# Patient Record
Sex: FEMALE | Race: WHITE | NOT HISPANIC OR LATINO | Employment: OTHER | URBAN - METROPOLITAN AREA
[De-identification: names, ages, dates, MRNs, and addresses within clinical notes are randomized per-mention and may not be internally consistent; named-entity substitution may affect disease eponyms.]

---

## 2017-01-05 RX ORDER — AMLODIPINE BESYLATE 10 MG/1
TABLET ORAL
Qty: 90 TABLET | Refills: 0 | Status: SHIPPED | OUTPATIENT
Start: 2017-01-05 | End: 2017-04-06 | Stop reason: SDUPTHER

## 2017-01-12 ENCOUNTER — OFFICE VISIT (OUTPATIENT)
Dept: INTERNAL MEDICINE | Facility: CLINIC | Age: 82
End: 2017-01-12

## 2017-01-12 VITALS
SYSTOLIC BLOOD PRESSURE: 126 MMHG | DIASTOLIC BLOOD PRESSURE: 58 MMHG | WEIGHT: 104 LBS | HEART RATE: 70 BPM | BODY MASS INDEX: 20.31 KG/M2 | TEMPERATURE: 98.3 F | OXYGEN SATURATION: 97 % | RESPIRATION RATE: 16 BRPM

## 2017-01-12 DIAGNOSIS — S61.452A DOG BITE, HAND, LEFT, INITIAL ENCOUNTER: Primary | ICD-10-CM
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
## 2017-01-12 PROCEDURE — 99213 OFFICE O/P EST LOW 20 MIN: CPT | Performed by: NURSE PRACTITIONER

## 2017-01-12 RX ORDER — AMOXICILLIN AND CLAVULANATE POTASSIUM 250; 125 MG/1; MG/1
1 TABLET, FILM COATED ORAL 2 TIMES DAILY
Qty: 14 TABLET | Refills: 0 | Status: SHIPPED | OUTPATIENT
Start: 2017-01-12 | End: 2017-02-06

## 2017-01-12 NOTE — PROGRESS NOTES
Vitals:    01/12/17 1502   BP: 126/58   Pulse: 70   Resp: 16   Temp: 98.3 °F (36.8 °C)   SpO2: 97%     Last 2 weights    01/12/17  1502   Weight: 104 lb (47.2 kg)     Social History   Substance Use Topics   • Smoking status: Former Smoker   • Smokeless tobacco: Not on file   • Alcohol use No       Subjective     HPI  Pt presents to office with left fingers swelling, hand redness, and a 1/4 inch wound from recent dog bite. She states she was picking her dog up and apparently bit her (not out of aggression) breaking the skin on the dorsal part of her left hand. She states she started noticing it getting red and swollen a day afterwards. She never saw oozing, spreading of the redness, and denies pain. She is does has full ROM of wrist, and fingers, and state that the swelling in fingers has gone down some. Denies fever, numbness/tingling/loss of sensation of left hand.     The following portions of the patient's history were reviewed and updated as appropriate: allergies, current medications, past medical history, past social history and problem list.    Review of Systems   Constitutional: Negative.    Respiratory: Negative.    Cardiovascular: Negative.    Skin:        Dog bite on left dorsal hand       Objective     Physical Exam   Constitutional: She is oriented to person, place, and time. She appears well-developed and well-nourished.   HENT:   Head: Normocephalic and atraumatic.   Neck: Normal range of motion.   Cardiovascular: Normal rate, regular rhythm and normal heart sounds.    Pulmonary/Chest: Effort normal and breath sounds normal.   Musculoskeletal: Normal range of motion.   Neurological: She is alert and oriented to person, place, and time.   Skin:        1/4 inch healing scab on dorsal left hand where dog has bit pt. No signs of drainage, swelling at the site, redness at the site. There is swelling and redness of pts fingers. Full ROM. No streaking up arm noted.   Nursing note and vitals  reviewed.      Assessment/Plan   Raina was seen today for upper extremity issue.    Diagnoses and all orders for this visit:    Dog bite, hand, left, initial encounter    Other orders  -     amoxicillin-clavulanate (AUGMENTIN) 250-125 MG per tablet; Take 1 tablet by mouth 2 (Two) Times a Day.         -augmentin for dog bite. Renal adjusted from last CMP back in November  -educated pt on worsening s/s and when to come back into office  -cont home meds  -discussed with Dr. Selby in regard to needing tetnus. Since pt has seen improvement in hand from initial bite we will hold off on giving tetnus  -FU prn of if symptoms persist/worsen

## 2017-01-12 NOTE — MR AVS SNAPSHOT
Raina Jackson   1/12/2017 3:00 PM   Office Visit    Dept Phone:  [phone]   Encounter #:  [id number]    Provider:  DELORES Castañeda   Department:  [facility] INTERNAL MEDICINE                Your Full Care Plan              Today's Medication Changes          These changes are accurate as of: 1/12/17  3:30 PM.  If you have any questions, ask your nurse or doctor.               New Medication(s)Ordered:     amoxicillin-clavulanate 250-125 MG per tablet   Commonly known as:  AUGMENTIN   Take 1 tablet by mouth 2 (Two) Times a Day.            Where to Get Your Medications      These medications were sent to [id number] - [place], [place] - [address] - [phone]  - [phone]   [address] [address]     Phone:  [phone]     amoxicillin-clavulanate 250-125 MG per tablet                  Your Updated Medication List          This list is accurate as of: 1/12/17  3:30 PM.  Always use your most recent med list.                amLODIPine 10 MG tablet   Commonly known as:  NORVASC   TAKE 1 TABLET BY MOUTH ONE TIME A DAY       amoxicillin-clavulanate 250-125 MG per tablet   Commonly known as:  AUGMENTIN   Take 1 tablet by mouth 2 (Two) Times a Day.       bumetanide 0.5 MG tablet   Commonly known as:  BUMEX   TAKE 1 TABLET BY MOUTH IN THE MORNING       calcium-vitamin D 500-200 MG-UNIT per tablet   Commonly known as:  OSCAL-500       carteolol 1 % ophthalmic solution   Commonly known as:  OCUPRESS       cholecalciferol 1000 UNITS tablet   Commonly known as:  VITAMIN D3       denosumab 60 MG/ML solution syringe   Commonly known as:  PROLIA       ONE-A-DAY WOMENS PO               Instructions     None    Patient Instructions History      Upcoming Appointments     Visit Type Date Time Department    OFFICE VISIT 1/12/2017  3:00 PM [facility] PC [facility] 1 4003    LAB 1/23/2017 12:00 PM [facility] ONC CBC LAB [facility]    VITALS ONLY 2/6/2017 11:00  AM  LAG ONC CBC LAB [facility]    FOLLOW UP 1 UNIT 2/6/2017 11:20 AM [facility] ONC CBC [facility]    OFFICE VISIT 4/19/2017  9:40 AM [facility] PC [facility] 1 4003    INJECTION 5/22/2017 10:30 AM  [facility] OP INFU NON ONC      MyChart Signup     Our records indicate that you have declined [facility] MyChart signup. If you would like to sign up for MyChart, please email [email or web address] or call [phone] to obtain an activation code.             Other Info from Your Visit           Your Appointments     Jan 23, 2017 12:00 PM EST   Lab with LAB CHAIR 6 [facility]    [address]  [facility] [address] [phone]            Feb 06, 2017 11:00 AM EST   Vitals Only with VITALS ONLY CBC [facility]    [address]  [facility] [address] [phone]            Feb 06, 2017 11:20 AM EST   FOLLOW UP with Gregory Syed II, MD   [facility] CBC GROUP: [facility]SULTANTS IN BLOOD DISORDERS AND CANCER (CBC [place])    [address]  [facility] [address] [phone]            Apr 19, 2017  9:40 AM EDT   Office Visit with Allan Shea MD   [facility] INTERNAL MEDICINE (--)    [address] [place] [address]. [address]  [facility] [address] [phone]           Arrive 15 minutes prior to appointment.            May 22, 2017 10:30 AM EDT   Injection with ROOM 6 [facility])    [address] [address] [phone]              Allergies     Sulfa Antibiotics        Reason for Visit     Upper Extremity Issue swelling      Vital Signs     Blood Pressure Pulse Temperature Respirations Weight Oxygen Saturation    126/58 (BP Location: Right arm, Patient Position: Sitting, Cuff Size: Small Adult) 70 98.3 °F (36.8 °C) (Tympanic) 16 104 lb (47.2 kg) 97%    Body Mass Index Smoking Status                20.31 kg/m2  Former Smoker

## 2017-01-16 ENCOUNTER — TELEPHONE (OUTPATIENT)
Dept: INTERNAL MEDICINE | Facility: CLINIC | Age: 82
End: 2017-01-16

## 2017-01-16 NOTE — TELEPHONE ENCOUNTER
Pt called and said Tri put her on an antibiotic and on the label it says it could cause diarrhea and possible CDIFF and she has already taken 5 doses but would like to know if she could discontinue. She does not want to risk getting cdiff again. Please advise.

## 2017-01-16 NOTE — TELEPHONE ENCOUNTER
Call and tell her that she may stop the anabiotic.  Dog bites do not pose a big risk for infection

## 2017-01-23 ENCOUNTER — HOSPITAL ENCOUNTER (OUTPATIENT)
Dept: GENERAL RADIOLOGY | Facility: HOSPITAL | Age: 82
Discharge: HOME | End: 2017-01-23
Attending: INTERNAL MEDICINE | Admitting: INTERNAL MEDICINE

## 2017-01-23 ENCOUNTER — LAB (OUTPATIENT)
Dept: LAB | Facility: HOSPITAL | Age: 82
End: 2017-01-23

## 2017-01-23 DIAGNOSIS — D50.9 IRON DEFICIENCY ANEMIA: ICD-10-CM

## 2017-01-23 DIAGNOSIS — D47.2 SMOLDERING MULTIPLE MYELOMA (SMM): ICD-10-CM

## 2017-01-23 LAB
ANION GAP SERPL CALCULATED.3IONS-SCNC: 13.2 MMOL/L
BASOPHILS # BLD AUTO: 0.02 10*3/MM3 (ref 0–0.1)
BASOPHILS NFR BLD AUTO: 0.4 % (ref 0–1.1)
BUN BLD-MCNC: 40 MG/DL (ref 6–20)
BUN/CREAT SERPL: 26.1 (ref 7.3–30)
CALCIUM SPEC-SCNC: 10.1 MG/DL (ref 8.5–10.2)
CHLORIDE SERPL-SCNC: 109 MMOL/L (ref 98–107)
CO2 SERPL-SCNC: 22.8 MMOL/L (ref 22–29)
CREAT BLD-MCNC: 1.53 MG/DL (ref 0.6–1.1)
DEPRECATED RDW RBC AUTO: 51 FL (ref 37–49)
EOSINOPHIL # BLD AUTO: 0.06 10*3/MM3 (ref 0–0.36)
EOSINOPHIL NFR BLD AUTO: 1.1 % (ref 1–5)
ERYTHROCYTE [DISTWIDTH] IN BLOOD BY AUTOMATED COUNT: 14.3 % (ref 11.7–14.5)
FERRITIN SERPL-MCNC: 44.5 NG/ML
GFR SERPL CREATININE-BSD FRML MDRD: 32 ML/MIN/1.73
GLUCOSE BLD-MCNC: 88 MG/DL (ref 74–124)
HCT VFR BLD AUTO: 30.5 % (ref 34–45)
HGB BLD-MCNC: 9.7 G/DL (ref 11.5–14.9)
HGB RETIC QN: 35.3 PG (ref 29.8–36.1)
IMM GRANULOCYTES # BLD: 0.02 10*3/MM3 (ref 0–0.03)
IMM GRANULOCYTES NFR BLD: 0.4 % (ref 0–0.5)
IMM RETICS NFR: 10.7 % (ref 3–15.8)
IRON 24H UR-MRATE: 57 MCG/DL (ref 37–145)
IRON SATN MFR SERPL: 19 % (ref 14–48)
LYMPHOCYTES # BLD AUTO: 0.95 10*3/MM3 (ref 1–3.5)
LYMPHOCYTES NFR BLD AUTO: 17.3 % (ref 20–49)
MCH RBC QN AUTO: 30.6 PG (ref 27–33)
MCHC RBC AUTO-ENTMCNC: 31.8 G/DL (ref 32–35)
MCV RBC AUTO: 96.2 FL (ref 83–97)
MONOCYTES # BLD AUTO: 0.25 10*3/MM3 (ref 0.25–0.8)
MONOCYTES NFR BLD AUTO: 4.6 % (ref 4–12)
NEUTROPHILS # BLD AUTO: 4.18 10*3/MM3 (ref 1.5–7)
NEUTROPHILS NFR BLD AUTO: 76.2 % (ref 39–75)
NRBC BLD MANUAL-RTO: 0 /100 WBC (ref 0–0)
PLATELET # BLD AUTO: 189 10*3/MM3 (ref 150–375)
PMV BLD AUTO: 9.5 FL (ref 8.9–12.1)
POTASSIUM BLD-SCNC: 4.3 MMOL/L (ref 3.5–4.7)
RBC # BLD AUTO: 3.17 10*6/MM3 (ref 3.9–5)
RETICS/RBC NFR AUTO: 0.93 % (ref 0.6–2)
SODIUM BLD-SCNC: 145 MMOL/L (ref 134–145)
TIBC SERPL-MCNC: 297 MCG/DL (ref 249–505)
TRANSFERRIN SERPL-MCNC: 212 MG/DL (ref 200–360)
WBC NRBC COR # BLD: 5.48 10*3/MM3 (ref 4–10)

## 2017-01-23 PROCEDURE — 36415 COLL VENOUS BLD VENIPUNCTURE: CPT

## 2017-01-23 PROCEDURE — 84466 ASSAY OF TRANSFERRIN: CPT

## 2017-01-23 PROCEDURE — 77075 RADEX OSSEOUS SURVEY COMPL: CPT

## 2017-01-23 PROCEDURE — 85046 RETICYTE/HGB CONCENTRATE: CPT

## 2017-01-23 PROCEDURE — 80048 BASIC METABOLIC PNL TOTAL CA: CPT

## 2017-01-23 PROCEDURE — 83540 ASSAY OF IRON: CPT

## 2017-01-23 PROCEDURE — 82728 ASSAY OF FERRITIN: CPT

## 2017-01-23 PROCEDURE — 85025 COMPLETE CBC W/AUTO DIFF WBC: CPT

## 2017-01-24 LAB
ALBUMIN SERPL-MCNC: 3.5 G/DL (ref 2.9–4.4)
ALBUMIN/GLOB SERPL: 1.3 {RATIO} (ref 0.7–1.7)
ALPHA1 GLOB FLD ELPH-MCNC: 0.2 G/DL (ref 0–0.4)
ALPHA2 GLOB SERPL ELPH-MCNC: 0.6 G/DL (ref 0.4–1)
B-GLOBULIN SERPL ELPH-MCNC: 0.7 G/DL (ref 0.7–1.3)
GAMMA GLOB SERPL ELPH-MCNC: 1 G/DL (ref 0.4–1.8)
GLOBULIN SER CALC-MCNC: 2.6 G/DL (ref 2.2–3.9)
IGA SERPL-MCNC: 21 MG/DL (ref 64–422)
IGG SERPL-MCNC: 1048 MG/DL (ref 700–1600)
IGM SERPL-MCNC: 19 MG/DL (ref 26–217)
Lab: ABNORMAL
M-SPIKE: 0.9 G/DL
PROT PATTERN SERPL IFE-IMP: ABNORMAL
PROT SERPL-MCNC: 6.1 G/DL (ref 6–8.5)

## 2017-02-02 ENCOUNTER — APPOINTMENT (OUTPATIENT)
Dept: CT IMAGING | Facility: HOSPITAL | Age: 82
End: 2017-02-02

## 2017-02-02 ENCOUNTER — HOSPITAL ENCOUNTER (EMERGENCY)
Facility: HOSPITAL | Age: 82
Discharge: HOME | End: 2017-02-02
Attending: EMERGENCY MEDICINE

## 2017-02-02 VITALS
OXYGEN SATURATION: 98 % | BODY MASS INDEX: 19.63 KG/M2 | SYSTOLIC BLOOD PRESSURE: 166 MMHG | TEMPERATURE: 97 F | DIASTOLIC BLOOD PRESSURE: 96 MMHG | HEIGHT: 60 IN | RESPIRATION RATE: 18 BRPM | HEART RATE: 76 BPM | WEIGHT: 100 LBS

## 2017-02-02 DIAGNOSIS — S09.90XA MINOR HEAD INJURY WITHOUT LOSS OF CONSCIOUSNESS, INITIAL ENCOUNTER: Primary | ICD-10-CM

## 2017-02-02 DIAGNOSIS — S00.03XA SCALP HEMATOMA, INITIAL ENCOUNTER: ICD-10-CM

## 2017-02-02 PROCEDURE — 99284 EMERGENCY DEPT VISIT MOD MDM: CPT

## 2017-02-02 PROCEDURE — 70450 CT HEAD/BRAIN W/O DYE: CPT

## 2017-02-02 PROCEDURE — 72125 CT NECK SPINE W/O DYE: CPT

## 2017-02-02 RX ORDER — ONDANSETRON 4 MG/1
4 TABLET, ORALLY DISINTEGRATING ORAL ONCE
Status: COMPLETED | OUTPATIENT
Start: 2017-02-02 | End: 2017-02-02

## 2017-02-02 RX ORDER — ACETAMINOPHEN 325 MG/1
650 TABLET ORAL ONCE
Status: COMPLETED | OUTPATIENT
Start: 2017-02-02 | End: 2017-02-02

## 2017-02-02 RX ORDER — ONDANSETRON 4 MG/1
4 TABLET, ORALLY DISINTEGRATING ORAL EVERY 8 HOURS PRN
Qty: 12 TABLET | Refills: 0 | Status: SHIPPED | OUTPATIENT
Start: 2017-02-02 | End: 2017-07-11 | Stop reason: SDDI

## 2017-02-02 RX ORDER — ACETAMINOPHEN 500 MG
500 TABLET ORAL EVERY 6 HOURS PRN
Qty: 30 TABLET | Refills: 0 | Status: SHIPPED | OUTPATIENT
Start: 2017-02-02

## 2017-02-02 RX ADMIN — ONDANSETRON 4 MG: 4 TABLET, ORALLY DISINTEGRATING ORAL at 23:22

## 2017-02-02 RX ADMIN — ACETAMINOPHEN 650 MG: 325 TABLET ORAL at 23:22

## 2017-02-03 NOTE — DISCHARGE INSTRUCTIONS
Home, rest, medicine as directed, apply ice to affected area, follow up with PCP in 2 to 3 days for recheck.

## 2017-02-03 NOTE — ED NOTES
"Per ems- pt fell today at home from tripping, she did hit head but denies LOC. Pt not on blood thinners. Pt c/o pain \"all over\". Pt arrived with c-collar. Pt has pulsating \"mass\" on left side of neck that occurred after fall.      Emeli Celis RN  02/02/17 2124    "

## 2017-02-03 NOTE — ED PROVIDER NOTES
I supervised care provided by the midlevel provider.    We have discussed this patient's history, physical exam, and treatment plan.   I have reviewed the note and personally saw and examined the patient and agree with the plan of care.    Pt c/o head and neck injuries s/p fall earlier today in which pt lost her balance and tripped. Pt denies LOC, dyspnea, CP, abd pain, and bilateral hip pain. On physical exam, there is a scalp hematoma to the left occiput. PERRL. Lungs are CTAB. No focal neurological deficits. CT Head shows no acute intracranial pathology. CT C-Spine shows no acute fracture of the C-Spine.         Documentation assistance provided by Lexis Peña. Information recorded by the scribe was done at my direction and has been verified and validated by me.     Entered by Lexis Peña, acting as scribe for Dr. Amira MD.        Lexis Peña  02/02/17 4951       Sal Collier MD  02/03/17 3091

## 2017-02-03 NOTE — ED PROVIDER NOTES
EMERGENCY DEPARTMENT ENCOUNTER    CHIEF COMPLAINT  Chief Complaint: Fall,   History given by: patient  History limited by: n/a  Room Number: 02/02  PMD: Allan Shea MD      HPI:  Pt is a 87 y.o. female who presents complaining of a head injury secondary to a fall. Pt states that she lost her balance, fell, and hit her head. Pt also complains of tenderness to the left side of her neck. She denies LOC. Pt has no other complaints at this time.     Duration:  Prior to arrival  Onset: sudden  Timing: constant  Location: headache  Radiation: n/a  Quality: aching  Intensity/Severity: moderate   Progression: unchanged   Associated Symptoms: neck pain  Aggravating Factors: none  Alleviating Factors: none  Previous Episodes: unknown  Treatment before arrival: none    PAST MEDICAL HISTORY  Active Ambulatory Problems     Diagnosis Date Noted   • Anemia 03/31/2016   • Chronic kidney disease 03/31/2016   • Gastroesophageal reflux disease with esophagitis 03/31/2016   • Hypertension 03/31/2016   • Osteoporosis 03/31/2016   • Arthritis 03/31/2016   • Health care maintenance 03/31/2016   • 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   • Iron deficiency anemia 04/25/2016   • Skin lesion 05/27/2016     Resolved Ambulatory Problems     Diagnosis Date Noted   • Monoclonal gammopathy 03/31/2016     Past Medical History   Diagnosis Date   • Cataract    • Cystic mass of pancreatic head    • DVT (deep venous thrombosis) 08/2010   • Folate deficiency    • Frequent UTI    • Hypogammaglobulinemia    • Left hip pain    • Myeloma    • PICC (peripherally inserted central catheter) removal    • Renal insufficiency    • Small bowel perforation 07/2010   • Temporal arteritis 11/10/2005   • Torn rotator cuff 2007       PAST SURGICAL HISTORY  Past Surgical History   Procedure Laterality Date   • Hysterectomy  1995   • Bladder repair  1995   • Joint replacement  2002     Bilateral knee replacements   • Eye surgery  04/2007     Cataract   •  Colostomy  2009     Patient had a ruptured sigmoid diverticulum which led to a diverting colostomy   • Hernia repair  2010     Ventral hernia repair from prior sigmoid resection       FAMILY HISTORY  Family History   Problem Relation Age of Onset   • Cancer Neg Hx        SOCIAL HISTORY  Social History     Social History   • Marital status:      Spouse name: N/A   • Number of children: N/A   • Years of education: N/A     Occupational History   •  [employer]     Social History Main Topics   • Smoking status: Former Smoker   • Smokeless tobacco: Not on file   • Alcohol use No   • Drug use: Not on file   • Sexual activity: Not on file     Other Topics Concern   • Not on file     Social History Narrative    Spouse  from what sounds like cholangiocarcinoma.       ALLERGIES  Sulfa antibiotics    REVIEW OF SYSTEMS  Review of Systems   Constitutional: Negative for chills and fever.   HENT: Negative for sore throat.    Respiratory: Negative for cough.    Gastrointestinal: Negative for nausea and vomiting.   Genitourinary: Negative for dysuria.   Musculoskeletal: Positive for neck pain (left sided). Negative for back pain.   Neurological: Positive for headaches.   Psychiatric/Behavioral: The patient is not nervous/anxious.        PHYSICAL EXAM  ED Triage Vitals   Temp Heart Rate Resp BP SpO2   17   96.7 °F (35.9 °C) 73 16 187/81 98 %      Temp src Heart Rate Source Patient Position BP Location FiO2 (%)   17 -- --   Tympanic Monitor Lying         Physical Exam   Constitutional: She is oriented to person, place, and time and well-developed, well-nourished, and in no distress.   HENT:   Right Ear: No hemotympanum.   Left Ear: No hemotympanum.   Right posterior scalp hematoma   Eyes: EOM are normal. Pupils are equal, round, and reactive to light.   Neck: Normal range of motion. No spinous  process tenderness and no muscular tenderness present.   Cardiovascular: Normal rate and regular rhythm.    Pulmonary/Chest: Effort normal and breath sounds normal. No respiratory distress.   Musculoskeletal:   No bony extremity tenderness or deformity.    Neurological: She is alert and oriented to person, place, and time. She has normal sensation and normal strength.   Skin: Skin is warm and dry.   Psychiatric: Affect normal.   Nursing note and vitals reviewed.      LAB RESULTS  Lab Results (last 24 hours)     ** No results found for the last 24 hours. **          I ordered the above labs and reviewed the results    RADIOLOGY  CT Cervical Spine Without Contrast   Preliminary Result   No acute fracture of the cervical spine. Moderate spondylosis.                  CT Head Without Contrast   Preliminary Result   No acute intracranial pathology.                            I ordered the above noted radiological studies. Interpreted by radiologist. Discussed with radiologist. Reviewed by me in PACS.       PROCEDURES  Procedures      PROGRESS AND CONSULTS  ED Course     21:23  CT head ordered for further evaluation.     22:15  CT cervical spine ordered for further evaluation.     23:14  Rechecked the patient who is in NAD and is resting comfortably. Advised pt and family that the CT  And c-spine showed NAD. Pt will be discharged. Advised pt to use a cane or walker while ambulating. Pt understands and agrees with the plan, all questions answered.    23:15  Discussed pt with Dr. Collier who agrees with plan of care.     MEDICAL DECISION MAKING  Results were reviewed/discussed with the patient and they were also made aware of online access. Pt also made aware that some labs, such as cultures, will not be resulted during ER visit and follow up with PMD is necessary.     MDM  Number of Diagnoses or Management Options  Minor head injury without loss of consciousness, initial encounter:   Scalp hematoma, initial encounter:       Amount and/or Complexity of Data Reviewed  Tests in the radiology section of CPT®: ordered and reviewed (CT head and cervical spine shows NAD)    Patient Progress  Patient progress: stable         DIAGNOSIS  Final diagnoses:   Minor head injury without loss of consciousness, initial encounter   Scalp hematoma, initial encounter       DISPOSITION  DISCHARGE    Patient discharged in stable condition.    Reviewed implications of results, diagnosis, meds, responsibility to follow up, warning signs and symptoms of possible worsening, potential complications and reasons to return to ER.    Patient/Family voiced understanding of above instructions.    Discussed plan for discharge, as there is no emergent indication for admission.  Pt/family is agreeable and understands need for follow up and repeat testing.  Pt is aware that discharge does not mean that nothing is wrong but it indicates no emergency is present that requires admission and they must continue care with follow-up as given below or physician of their choice.     FOLLOW-UP  Allan Shea MD  [facility] [place] [address]  [phone]    Schedule an appointment as soon as possible for a visit in 2 days           Medication List      New Prescriptions          acetaminophen 500 MG tablet   Commonly known as:  TYLENOL   Take 1 tablet by mouth Every 6 (Six) Hours As Needed for mild pain (1-3).       ondansetron ODT 4 MG disintegrating tablet   Commonly known as:  ZOFRAN ODT   Take 1 tablet by mouth Every 8 (Eight) Hours As Needed for nausea.           Latest Documented Vital Signs:  As of 11:28 PM  BP- 166/96 HR- 76 Temp- 96.7 °F (35.9 °C) (Tympanic) O2 sat- 98%    --  Documentation assistance provided by daina Downing for Shan Busch PA-C.  Information recorded by the daina was done at my direction and has been verified and validated by me.        Evelin Downing  02/02/17 4395       MARY Fernando  02/02/17 1265

## 2017-02-06 ENCOUNTER — APPOINTMENT (OUTPATIENT)
Dept: LAB | Facility: HOSPITAL | Age: 82
End: 2017-02-06

## 2017-02-06 ENCOUNTER — OFFICE VISIT (OUTPATIENT)
Dept: ONCOLOGY | Facility: CLINIC | Age: 82
End: 2017-02-06

## 2017-02-06 VITALS
RESPIRATION RATE: 14 BRPM | WEIGHT: 103.8 LBS | BODY MASS INDEX: 20.38 KG/M2 | DIASTOLIC BLOOD PRESSURE: 72 MMHG | SYSTOLIC BLOOD PRESSURE: 128 MMHG | HEIGHT: 60 IN | OXYGEN SATURATION: 98 % | HEART RATE: 71 BPM | TEMPERATURE: 98 F

## 2017-02-06 DIAGNOSIS — D50.9 IRON DEFICIENCY ANEMIA, UNSPECIFIED IRON DEFICIENCY ANEMIA TYPE: ICD-10-CM

## 2017-02-06 DIAGNOSIS — D47.2 SMOLDERING MULTIPLE MYELOMA (SMM): Primary | ICD-10-CM

## 2017-02-06 PROCEDURE — G0463 HOSPITAL OUTPT CLINIC VISIT: HCPCS | Performed by: INTERNAL MEDICINE

## 2017-02-06 PROCEDURE — 99214 OFFICE O/P EST MOD 30 MIN: CPT | Performed by: INTERNAL MEDICINE

## 2017-02-06 NOTE — PROGRESS NOTES
Subjective .     REASONS FOR FOLLOWUP:  Smoldering multiple myeloma, anemia    HISTORY OF PRESENT ILLNESS:  The patient is a 87 y.o. year old female  who is here for follow-up with the above-mentioned history.    Denies fever or chills.  Denies unintentional significant weight loss  Denies drenching night sweats.  Denies recurrent or unusual infections.  Denies pain, other than chronic joint pain and recent pain related to a fall at home.  She went to the emergency room and states everything checked out okay.          Past Medical History   Diagnosis Date   • Anemia      Secondary to chronic renal insufficiency and folate insufficiency   • Cataract    • Cystic mass of pancreatic head      On CT of 01/16/2009. stable in 02/2011   • DVT (deep venous thrombosis) 08/2010     PICC-associated DVT resulting in removal of PICC   • Folate deficiency    • Frequent UTI    • Hypertension    • Hypogammaglobulinemia    • Left hip pain    • Myeloma      Smoldering myeloma versus multiple myeloma   • PICC (peripherally inserted central catheter) removal      Secondary to DVT   • Renal insufficiency      Chronic   • Small bowel perforation 07/2010     Small bowel and cecal perforation with abcess formation and infected mesh from prior hernia repair   • Temporal arteritis 11/10/2005   • Torn rotator cuff 2007     Past Surgical History   Procedure Laterality Date   • Hysterectomy  1995   • Bladder repair  1995   • Joint replacement  2002     Bilateral knee replacements   • Eye surgery  04/2007     Cataract   • Colostomy  01/2009     Patient had a ruptured sigmoid diverticulum which led to a diverting colostomy   • Hernia repair  04/26/2010     Ventral hernia repair from prior sigmoid resection       HEMATOLOGIC/ONCOLOGIC HISTORY:  (History from previous dates can be found in the separate document.)    MEDICATIONS    Current Outpatient Prescriptions:   •  acetaminophen (TYLENOL) 500 MG tablet, Take 1 tablet by mouth Every 6 (Six)  Hours As Needed for mild pain (1-3)., Disp: 30 tablet, Rfl: 0  •  amLODIPine (NORVASC) 10 MG tablet, TAKE 1 TABLET BY MOUTH ONE TIME A DAY , Disp: 90 tablet, Rfl: 0  •  bumetanide (BUMEX) 0.5 MG tablet, TAKE 1 TABLET BY MOUTH IN THE MORNING , Disp: 90 tablet, Rfl: 0  •  calcium-vitamin D (OSCAL-500) 500-200 MG-UNIT per tablet, Take 1 tablet by mouth., Disp: , Rfl:   •  carteolol (OCUPRESS) 1 % ophthalmic solution, Apply  to eye., Disp: , Rfl:   •  cholecalciferol (VITAMIN D3) 1000 UNITS tablet, Take 1,000 Units by mouth Daily., Disp: , Rfl:   •  denosumab (PROLIA) 60 MG/ML solution syringe, Inject  under the skin., Disp: , Rfl:   •  Multiple Vitamins-Calcium (ONE-A-DAY WOMENS PO), Take  by mouth., Disp: , Rfl:   •  ondansetron ODT (ZOFRAN ODT) 4 MG disintegrating tablet, Take 1 tablet by mouth Every 8 (Eight) Hours As Needed for nausea., Disp: 12 tablet, Rfl: 0    ALLERGIES:     Allergies   Allergen Reactions   • Sulfa Antibiotics        SOCIAL HISTORY:       Social History     Social History   • Marital status:      Spouse name: N/A   • Number of children: N/A   • Years of education: N/A     Occupational History   •  [employer]     Social History Main Topics   • Smoking status: Former Smoker   • Smokeless tobacco: Not on file   • Alcohol use No   • Drug use: Not on file   • Sexual activity: Not on file     Other Topics Concern   • Not on file     Social History Narrative    Spouse  from what sounds like cholangiocarcinoma.         FAMILY HISTORY:  Family History   Problem Relation Age of Onset   • Cancer Neg Hx        REVIEW OF SYSTEMS:  GENERAL: No change in appetite or weight;   No fevers, chills, sweats.    SKIN: No nonhealing lesions.   No rashes.  HEME/LYMPH: No easy bruising, bleeding.   No swollen nodes.   EYES: No vision changes or diplopia.   ENT: No tinnitus, hearing loss, gum bleeding, epistaxis, hoarseness or dysphagia.   RESPIRATORY: No cough, shortness of breath, hemoptysis  "or wheezing.   CVS: No chest pain, palpitations, orthopnea, dyspnea on exertion or PND.   GI: No melena or hematochezia.   No abdominal pain.  No nausea, vomiting, constipation, diarrhea  : No lower tract obstructive symptoms, dysuria or hematuria.   MUSCULOSKELETAL: see HPI   NEUROLOGICAL: No global weakness, loss of consciousness or seizures.   PSYCHIATRIC: No increased nervousness, mood changes or depression.       Objective    Vitals:    02/06/17 1111   BP: 128/72   Pulse: 71   Resp: 14   Temp: 98 °F (36.7 °C)   TempSrc: Oral   SpO2: 98%   Weight: 103 lb 12.8 oz (47.1 kg)   Height: 60\" (152.4 cm)   PainSc: 0-No pain     Current Status 2/6/2017   ECOG score 1      PHYSICAL EXAM:    GENERAL:  Well-developed, well-nourished in no acute distress.   SKIN:  Warm, dry without rashes, purpura or petechiae.  HEAD:  Normocephalic.  EYES:  Pupils equal, round and reactive to light.  EOMs intact.  Conjunctivae normal.  EARS:  Hearing intact.  NOSE:  Septum midline.  No excoriations or nasal discharge.  MOUTH:  Tongue is well-papillated; no stomatitis or ulcers.  Lips normal.  THROAT:  Oropharynx without lesions or exudates.  NECK:  Supple with good range of motion; no thyromegaly or masses, no JVD.  LYMPHATICS:  No cervical, supraclavicular, axillary or inguinal adenopathy.  CHEST:  Lungs clear to percussion and auscultation. Good airflow.  CARDIAC:  Regular rate and rhythm without murmurs, rubs or gallops. Normal S1,S2.  ABDOMEN:  Soft, nontender with no organomegaly or masses.  EXTREMITIES:  No clubbing, cyanosis or edema.  NEUROLOGICAL:  Cranial Nerves II-XII grossly intact.  No focal neurological deficits.  PSYCHIATRIC:  Normal affect and mood.      RECENT LABS:        WBC   Date/Time Value Ref Range Status   01/23/2017 12:06 PM 5.48 4.00 - 10.00 10*3/mm3 Final   03/04/2016 12:13 PM 4.56 4.50 - 10.70 K/Cumm Final     HEMOGLOBIN   Date/Time Value Ref Range Status   01/23/2017 12:06 PM 9.7 (L) 11.5 - 14.9 g/dL Final "   03/04/2016 12:13 PM 10.1 (L) 11.9 - 15.5 g/dL Final     PLATELETS   Date/Time Value Ref Range Status   01/23/2017 12:06  150 - 375 10*3/mm3 Final   03/04/2016 12:13  140 - 500 K/Cumm Final       Assessment/Plan     ASSESSMENT:  1.  Smoldering myeloma.  Bone marrow 3/31/11 revealed 10% monoclonal IgG kappa plasma cells.  Main issue is anemia.  She responds to Procrit.   M spike stable at 0.9 compared to 0.8 8/8/16.    2.  Iron deficiency anemia.  History of IV iron.  (In the past, difficulty tolerating by mouth iron).  However, has been on iron 3 times per week since the fall 2012.  Hemoglobin is stable.    Recent iron labs unremarkable.    3.  Anemia of chronic renal insufficiency, stage III.  She likes to minimize Procrit.  Has not received Procrit for quite some time.  Sees Dr. Federico Cagle.    4.  PICC associated DVT August 2010.  Completed 6 months Coumadin 3/20/11.  (If treatment for myeloma is given, will need to keep this in mind, since she had a clot in the past).    5.  Cystic mass on pancreatic head on CT 1/16/09.  Completed 2 years of CAT scan follow-up 02/02/11.  Plan no more routine CAT scans.    PLAN:  · M.D. 6 months.  2 weeks prior:  · Serum protein studies, iron labs, CBC, BMP  · She declines any more urine studies  · She wants M.D. follow-up no more often than every 6 months  · She prefers bone surveys every 2 years  · Last bone survey 1/23/17, no lytic lesions.

## 2017-02-07 ENCOUNTER — TELEPHONE (OUTPATIENT)
Dept: SOCIAL WORK | Facility: HOSPITAL | Age: 82
End: 2017-02-07

## 2017-02-07 NOTE — TELEPHONE ENCOUNTER
"ER F/U phone call: Pt states that she is doing fine, just \"sore\".  Denies the need to see her PCP at this time. No other questions or concerns voiced at this time. Bernadette Shin RN    "

## 2017-02-24 ENCOUNTER — LAB (OUTPATIENT)
Dept: LAB | Facility: HOSPITAL | Age: 82
End: 2017-02-24

## 2017-02-24 DIAGNOSIS — N18.30 CHRONIC KIDNEY DISEASE, STAGE 3 (MODERATE): Primary | ICD-10-CM

## 2017-02-24 LAB
25(OH)D3 SERPL-MCNC: 23 NG/ML (ref 30–100)
ALBUMIN SERPL-MCNC: 3.9 G/DL (ref 3.5–5.2)
ANION GAP SERPL CALCULATED.3IONS-SCNC: 13.6 MMOL/L
BASOPHILS # BLD AUTO: 0.02 10*3/MM3 (ref 0–0.1)
BASOPHILS NFR BLD AUTO: 0.5 % (ref 0–1.1)
BUN BLD-MCNC: 43 MG/DL (ref 6–20)
BUN/CREAT SERPL: 27 (ref 7.3–30)
CALCIUM SPEC-SCNC: 9.7 MG/DL (ref 8.5–10.2)
CHLORIDE SERPL-SCNC: 109 MMOL/L (ref 98–107)
CO2 SERPL-SCNC: 19.4 MMOL/L (ref 22–29)
CREAT BLD-MCNC: 1.59 MG/DL (ref 0.6–1.1)
DEPRECATED RDW RBC AUTO: 52.5 FL (ref 37–49)
EOSINOPHIL # BLD AUTO: 0.07 10*3/MM3 (ref 0–0.36)
EOSINOPHIL NFR BLD AUTO: 1.6 % (ref 1–5)
ERYTHROCYTE [DISTWIDTH] IN BLOOD BY AUTOMATED COUNT: 14.6 % (ref 11.7–14.5)
GFR SERPL CREATININE-BSD FRML MDRD: 31 ML/MIN/1.73
GLUCOSE BLD-MCNC: 95 MG/DL (ref 74–124)
HCT VFR BLD AUTO: 30.9 % (ref 34–45)
HGB BLD-MCNC: 9.6 G/DL (ref 11.5–14.9)
IMM GRANULOCYTES # BLD: 0.01 10*3/MM3 (ref 0–0.03)
IMM GRANULOCYTES NFR BLD: 0.2 % (ref 0–0.5)
LYMPHOCYTES # BLD AUTO: 0.9 10*3/MM3 (ref 1–3.5)
LYMPHOCYTES NFR BLD AUTO: 20.4 % (ref 20–49)
MCH RBC QN AUTO: 30.2 PG (ref 27–33)
MCHC RBC AUTO-ENTMCNC: 31.1 G/DL (ref 32–35)
MCV RBC AUTO: 97.2 FL (ref 83–97)
MONOCYTES # BLD AUTO: 0.34 10*3/MM3 (ref 0.25–0.8)
MONOCYTES NFR BLD AUTO: 7.7 % (ref 4–12)
NEUTROPHILS # BLD AUTO: 3.08 10*3/MM3 (ref 1.5–7)
NEUTROPHILS NFR BLD AUTO: 69.6 % (ref 39–75)
NRBC BLD MANUAL-RTO: 0 /100 WBC (ref 0–0)
PHOSPHATE SERPL-MCNC: 5 MG/DL (ref 2.5–4.5)
PLATELET # BLD AUTO: 166 10*3/MM3 (ref 150–375)
PMV BLD AUTO: 10 FL (ref 8.9–12.1)
POTASSIUM BLD-SCNC: 4.7 MMOL/L (ref 3.5–4.7)
PTH-INTACT SERPL-MCNC: 33.4 PG/ML (ref 15–65)
RBC # BLD AUTO: 3.18 10*6/MM3 (ref 3.9–5)
SODIUM BLD-SCNC: 142 MMOL/L (ref 134–145)
WBC NRBC COR # BLD: 4.42 10*3/MM3 (ref 4–10)

## 2017-02-24 PROCEDURE — 80069 RENAL FUNCTION PANEL: CPT

## 2017-02-24 PROCEDURE — 82306 VITAMIN D 25 HYDROXY: CPT | Performed by: INTERNAL MEDICINE

## 2017-02-24 PROCEDURE — 36415 COLL VENOUS BLD VENIPUNCTURE: CPT

## 2017-02-24 PROCEDURE — 85025 COMPLETE CBC W/AUTO DIFF WBC: CPT

## 2017-02-24 PROCEDURE — 83970 ASSAY OF PARATHORMONE: CPT | Performed by: INTERNAL MEDICINE

## 2017-04-06 RX ORDER — AMLODIPINE BESYLATE 10 MG/1
TABLET ORAL
Qty: 90 TABLET | Refills: 0 | Status: SHIPPED | OUTPATIENT
Start: 2017-04-06 | End: 2017-07-07 | Stop reason: SDUPTHER

## 2017-04-19 ENCOUNTER — OFFICE VISIT (OUTPATIENT)
Dept: INTERNAL MEDICINE | Facility: CLINIC | Age: 82
End: 2017-04-19

## 2017-04-19 VITALS
BODY MASS INDEX: 20.22 KG/M2 | WEIGHT: 103 LBS | RESPIRATION RATE: 16 BRPM | OXYGEN SATURATION: 95 % | HEIGHT: 60 IN | SYSTOLIC BLOOD PRESSURE: 140 MMHG | DIASTOLIC BLOOD PRESSURE: 60 MMHG | HEART RATE: 63 BPM

## 2017-04-19 DIAGNOSIS — N18.30 CHRONIC KIDNEY DISEASE, STAGE 3 (MODERATE): ICD-10-CM

## 2017-04-19 DIAGNOSIS — R20.0 NUMBNESS: ICD-10-CM

## 2017-04-19 DIAGNOSIS — Z86.2 PERSONAL HISTORY OF DISEASES OF THE BLOOD AND BLOOD-FORMING ORGANS AND CERTAIN DISORDERS INVOLVING THE IMMUNE MECHANISM: ICD-10-CM

## 2017-04-19 DIAGNOSIS — M81.0 OSTEOPOROSIS: ICD-10-CM

## 2017-04-19 DIAGNOSIS — M19.90 ARTHRITIS: ICD-10-CM

## 2017-04-19 DIAGNOSIS — D50.9 IRON DEFICIENCY ANEMIA, UNSPECIFIED IRON DEFICIENCY ANEMIA TYPE: ICD-10-CM

## 2017-04-19 DIAGNOSIS — I10 ESSENTIAL HYPERTENSION: Primary | ICD-10-CM

## 2017-04-19 LAB
ALBUMIN SERPL-MCNC: 4 G/DL (ref 3.5–5.2)
ALBUMIN/GLOB SERPL: 1.5 G/DL
ALP SERPL-CCNC: 55 U/L (ref 39–117)
ALT SERPL-CCNC: 21 U/L (ref 1–33)
AST SERPL-CCNC: 25 U/L (ref 1–32)
BASOPHILS # BLD AUTO: 0.01 10*3/MM3 (ref 0–0.2)
BASOPHILS NFR BLD AUTO: 0.2 % (ref 0–1.5)
BILIRUB SERPL-MCNC: <0.2 MG/DL (ref 0.1–1.2)
BUN SERPL-MCNC: 41 MG/DL (ref 8–23)
BUN/CREAT SERPL: 25 (ref 7–25)
CALCIUM SERPL-MCNC: 10.1 MG/DL (ref 8.6–10.5)
CHLORIDE SERPL-SCNC: 110 MMOL/L (ref 98–107)
CO2 SERPL-SCNC: 21.6 MMOL/L (ref 22–29)
CREAT SERPL-MCNC: 1.64 MG/DL (ref 0.57–1)
EOSINOPHIL # BLD AUTO: 0.13 10*3/MM3 (ref 0–0.7)
EOSINOPHIL NFR BLD AUTO: 2.4 % (ref 0.3–6.2)
ERYTHROCYTE [DISTWIDTH] IN BLOOD BY AUTOMATED COUNT: 14.4 % (ref 11.7–13)
GLOBULIN SER CALC-MCNC: 2.7 GM/DL
GLUCOSE SERPL-MCNC: 87 MG/DL (ref 65–99)
HBA1C MFR BLD: 5.3 % (ref 4.8–5.6)
HCT VFR BLD AUTO: 31.7 % (ref 35.6–45.5)
HGB BLD-MCNC: 10.1 G/DL (ref 11.9–15.5)
IMM GRANULOCYTES # BLD: 0.02 10*3/MM3 (ref 0–0.03)
IMM GRANULOCYTES NFR BLD: 0.4 % (ref 0–0.5)
LYMPHOCYTES # BLD AUTO: 0.87 10*3/MM3 (ref 0.9–4.8)
LYMPHOCYTES NFR BLD AUTO: 15.8 % (ref 19.6–45.3)
MCH RBC QN AUTO: 30.5 PG (ref 26.9–32)
MCHC RBC AUTO-ENTMCNC: 31.9 G/DL (ref 32.4–36.3)
MCV RBC AUTO: 95.8 FL (ref 80.5–98.2)
MONOCYTES # BLD AUTO: 0.41 10*3/MM3 (ref 0.2–1.2)
MONOCYTES NFR BLD AUTO: 7.4 % (ref 5–12)
NEUTROPHILS # BLD AUTO: 4.08 10*3/MM3 (ref 1.9–8.1)
NEUTROPHILS NFR BLD AUTO: 73.8 % (ref 42.7–76)
PLATELET # BLD AUTO: 197 10*3/MM3 (ref 140–500)
POTASSIUM SERPL-SCNC: 4.4 MMOL/L (ref 3.5–5.2)
PROT SERPL-MCNC: 6.7 G/DL (ref 6–8.5)
RBC # BLD AUTO: 3.31 10*6/MM3 (ref 3.9–5.2)
SODIUM SERPL-SCNC: 145 MMOL/L (ref 136–145)
UNABLE TO VOID: NORMAL
VIT B12 SERPL-MCNC: 493 PG/ML (ref 211–946)
WBC # BLD AUTO: 5.52 10*3/MM3 (ref 4.5–10.7)

## 2017-04-19 PROCEDURE — 99214 OFFICE O/P EST MOD 30 MIN: CPT | Performed by: INTERNAL MEDICINE

## 2017-04-19 RX ORDER — FUROSEMIDE 20 MG/1
TABLET ORAL
Qty: 90 TABLET | Refills: 3
Start: 2017-04-19 | End: 2017-05-30

## 2017-04-19 NOTE — PROGRESS NOTES
Subjective   Raina Jackson is a 87 y.o. female.   4/6/2017  Wt Readings from Last 1 Encounters:   04/19/17 103 lb (46.7 kg)   She was last seen October 2016, weight 99 pounds then, now 13    She returns for follow-up of hypertension, chronic kidney disease, chronic anemia, osteoporosis, history of diplopia and 2 falls    History of Present Illness   Patient relates that she had 2 falls, one in February where March.  She does not describe these as orthostatic in nature.  She was in her kitchen both times and started to move but lost her balance and fell.  She does have a cane and a walker at home for use.  She was seen at the emergency department and had no intracranial injury.    She says that she has had diplopia.  She was seen by her eye doctor, Dr. maria, and prescribe glasses with prisms which have been effective.  She did not drive until her double vision was corrected with the new glasses.    She complains of some numbness involving her fingertips of both hands.    She has hypertension treated with amlodipine 10 mg daily and Bumex 0.5 mg daily.  Bumex is going to go to a higher tier on her formulary and she would like to try furosemide as an alternative for cost considerations.    She has osteoporosis treated with calcium and vitamin D supplements, supple vitamin D, and with Prolia injections at six-month intervals.  These well-tolerated.    She has chronic anemia which is of mixed origin.  She has a smoldering multiple myeloma which is followed by Dr. wade of the [facility].  She also has been on some iron replacement.  The numbness would prompt consideration of possible B12 involvement.    She has chronic shoulder problems with bilateral rotator cuff tears which are not felt to be operable.    She has chronic kidney disease which is been followed both by me and by Dr. Cagle.  Dr. Cagle has a discharge her.  No uremic symptoms are described.  The following portions of the patient's history were reviewed and  updated as appropriate: allergies, current medications, past family history, past medical history, past social history, past surgical history and problem list.    Review of Systems   Constitutional: Negative.    HENT: Negative.    Eyes: Negative.    Respiratory: Negative.    Cardiovascular: Negative.    Endocrine: Negative.    Genitourinary: Negative.    Skin: Negative.    Neurological: Positive for numbness.   Hematological: Negative.    Psychiatric/Behavioral: Negative.        Objective   Physical Exam   Constitutional: She appears well-developed and well-nourished.   HENT:   Head: Normocephalic and atraumatic.   Cardiovascular: Normal rate and regular rhythm.  Exam reveals no gallop and no friction rub.    No murmur heard.  Pulmonary/Chest: Effort normal and breath sounds normal. No respiratory distress. She has no wheezes. She has no rales.   Abdominal: Soft. Bowel sounds are normal. She exhibits no distension and no mass. There is no tenderness.   Neurological: She is alert.   Skin: Skin is warm.   Psychiatric: Her behavior is normal.   Vitals reviewed.      Assessment/Plan   Raina was seen today for hypertension, chronic kidney disease, anemia and numbness.    Diagnoses and all orders for this visit:    Essential hypertension  Comments:  Continue amlodipine 10 mg daily, we will change Bumex to furosemide 20 mg daily due to cost considerations  Orders:  -     Urinalysis With Microscopic    Chronic kidney disease, stage 3 (moderate)  Comments:  We will check renal function and electrolytes  Orders:  -     CBC & Differential  -     Comprehensive Metabolic Panel    Osteoporosis  Comments:  Continue calcium, vitamin D, and Prolia every 6 months    Arthritis    Iron deficiency anemia, unspecified iron deficiency anemia type    Numbness  Comments:  We will check vitamin B-12 level and hemoglobin A1c, advise the use of cane or walker to prevent falls  Orders:  -     Vitamin B12  -     Hemoglobin A1c    Personal history  of diseases of the blood and blood-forming organs and certain disorders involving the immune mechanism   -     Hemoglobin A1c    Other orders  -     furosemide (LASIX) 20 MG tablet; 1 by mouth every morning        Schedule office follow-up about 6 months, return sooner if needed                       EMR Dragon/Transcription disclaimer:      Much of this encounter note is an electronic transcription/translation of spoken language to printed text. The electronic translation of spoken language may permit erroneous, or at times, nonsensical words or phrases to be inadvertently transcribed; Although I have reviewed the note for such errors, some may still exist.

## 2017-05-19 DIAGNOSIS — M81.0 OSTEOPOROSIS: Primary | ICD-10-CM

## 2017-05-22 ENCOUNTER — HOSPITAL ENCOUNTER (OUTPATIENT)
Dept: INFUSION THERAPY | Facility: HOSPITAL | Age: 82
Discharge: HOME | End: 2017-05-22
Admitting: INTERNAL MEDICINE

## 2017-05-22 VITALS
HEART RATE: 53 BPM | DIASTOLIC BLOOD PRESSURE: 54 MMHG | BODY MASS INDEX: 20.22 KG/M2 | SYSTOLIC BLOOD PRESSURE: 123 MMHG | HEIGHT: 60 IN | OXYGEN SATURATION: 97 % | RESPIRATION RATE: 20 BRPM | TEMPERATURE: 97.9 F | WEIGHT: 103 LBS

## 2017-05-22 DIAGNOSIS — M81.0 OSTEOPOROSIS: ICD-10-CM

## 2017-05-22 PROCEDURE — 25010000002 DENOSUMAB 60 MG/ML SOLUTION: Performed by: INTERNAL MEDICINE

## 2017-05-22 PROCEDURE — 96401 CHEMO ANTI-NEOPL SQ/IM: CPT

## 2017-05-22 RX ORDER — VITAMIN B COMPLEX
1 CAPSULE ORAL DAILY
COMMUNITY

## 2017-05-22 RX ADMIN — DENOSUMAB 60 MG: 60 INJECTION SUBCUTANEOUS at 11:08

## 2017-05-30 RX ORDER — BUMETANIDE 0.5 MG/1
TABLET ORAL
Qty: 45 TABLET | Refills: 1 | Status: SHIPPED | OUTPATIENT
Start: 2017-05-30 | End: 2017-11-21 | Stop reason: SDUPTHER

## 2017-06-27 ENCOUNTER — LAB (OUTPATIENT)
Dept: LAB | Facility: HOSPITAL | Age: 82
End: 2017-06-27

## 2017-06-27 DIAGNOSIS — D47.2 SMOLDERING MULTIPLE MYELOMA (SMM): ICD-10-CM

## 2017-06-27 DIAGNOSIS — D50.9 IRON DEFICIENCY ANEMIA, UNSPECIFIED IRON DEFICIENCY ANEMIA TYPE: ICD-10-CM

## 2017-06-27 LAB
ANION GAP SERPL CALCULATED.3IONS-SCNC: 12.5 MMOL/L
BASOPHILS # BLD AUTO: 0.02 10*3/MM3 (ref 0–0.1)
BASOPHILS NFR BLD AUTO: 0.5 % (ref 0–1.1)
BUN BLD-MCNC: 43 MG/DL (ref 6–20)
BUN/CREAT SERPL: 28.1 (ref 7.3–30)
CALCIUM SPEC-SCNC: 9.3 MG/DL (ref 8.5–10.2)
CHLORIDE SERPL-SCNC: 109 MMOL/L (ref 98–107)
CO2 SERPL-SCNC: 22.5 MMOL/L (ref 22–29)
CREAT BLD-MCNC: 1.53 MG/DL (ref 0.6–1.1)
DEPRECATED RDW RBC AUTO: 50.3 FL (ref 37–49)
EOSINOPHIL # BLD AUTO: 0.07 10*3/MM3 (ref 0–0.36)
EOSINOPHIL NFR BLD AUTO: 1.6 % (ref 1–5)
ERYTHROCYTE [DISTWIDTH] IN BLOOD BY AUTOMATED COUNT: 13.9 % (ref 11.7–14.5)
FERRITIN SERPL-MCNC: 28.8 NG/ML
GFR SERPL CREATININE-BSD FRML MDRD: 32 ML/MIN/1.73
GLUCOSE BLD-MCNC: 95 MG/DL (ref 74–124)
HCT VFR BLD AUTO: 31.3 % (ref 34–45)
HGB BLD-MCNC: 9.9 G/DL (ref 11.5–14.9)
HGB RETIC QN: 34.4 PG (ref 29.8–36.1)
IMM GRANULOCYTES # BLD: 0.02 10*3/MM3 (ref 0–0.03)
IMM GRANULOCYTES NFR BLD: 0.5 % (ref 0–0.5)
IMM RETICS NFR: 8.4 % (ref 3–15.8)
IRON 24H UR-MRATE: 65 MCG/DL (ref 37–145)
IRON SATN MFR SERPL: 21 % (ref 14–48)
LYMPHOCYTES # BLD AUTO: 0.83 10*3/MM3 (ref 1–3.5)
LYMPHOCYTES NFR BLD AUTO: 19.1 % (ref 20–49)
MCH RBC QN AUTO: 31 PG (ref 27–33)
MCHC RBC AUTO-ENTMCNC: 31.6 G/DL (ref 32–35)
MCV RBC AUTO: 98.1 FL (ref 83–97)
MONOCYTES # BLD AUTO: 0.3 10*3/MM3 (ref 0.25–0.8)
MONOCYTES NFR BLD AUTO: 6.9 % (ref 4–12)
NEUTROPHILS # BLD AUTO: 3.11 10*3/MM3 (ref 1.5–7)
NEUTROPHILS NFR BLD AUTO: 71.4 % (ref 39–75)
NRBC BLD MANUAL-RTO: 0 /100 WBC (ref 0–0)
PLATELET # BLD AUTO: 163 10*3/MM3 (ref 150–375)
PMV BLD AUTO: 9.8 FL (ref 8.9–12.1)
POTASSIUM BLD-SCNC: 4.4 MMOL/L (ref 3.5–4.7)
RBC # BLD AUTO: 3.19 10*6/MM3 (ref 3.9–5)
RETICS/RBC NFR AUTO: 1.17 % (ref 0.6–2)
SODIUM BLD-SCNC: 144 MMOL/L (ref 134–145)
TIBC SERPL-MCNC: 302 MCG/DL (ref 249–505)
TRANSFERRIN SERPL-MCNC: 216 MG/DL (ref 200–360)
WBC NRBC COR # BLD: 4.35 10*3/MM3 (ref 4–10)

## 2017-06-27 PROCEDURE — 80048 BASIC METABOLIC PNL TOTAL CA: CPT

## 2017-06-27 PROCEDURE — 85025 COMPLETE CBC W/AUTO DIFF WBC: CPT

## 2017-06-27 PROCEDURE — 84466 ASSAY OF TRANSFERRIN: CPT

## 2017-06-27 PROCEDURE — 85046 RETICYTE/HGB CONCENTRATE: CPT

## 2017-06-27 PROCEDURE — 82728 ASSAY OF FERRITIN: CPT

## 2017-06-27 PROCEDURE — 83540 ASSAY OF IRON: CPT

## 2017-06-27 PROCEDURE — 36415 COLL VENOUS BLD VENIPUNCTURE: CPT

## 2017-06-28 LAB
ALBUMIN SERPL-MCNC: 4 G/DL (ref 2.9–4.4)
ALBUMIN/GLOB SERPL: 1.7 {RATIO} (ref 0.7–1.7)
ALPHA1 GLOB FLD ELPH-MCNC: 0.2 G/DL (ref 0–0.4)
ALPHA2 GLOB SERPL ELPH-MCNC: 0.6 G/DL (ref 0.4–1)
B-GLOBULIN SERPL ELPH-MCNC: 0.7 G/DL (ref 0.7–1.3)
GAMMA GLOB SERPL ELPH-MCNC: 1 G/DL (ref 0.4–1.8)
GLOBULIN SER CALC-MCNC: 2.5 G/DL (ref 2.2–3.9)
IGA SERPL-MCNC: 20 MG/DL (ref 64–422)
IGG SERPL-MCNC: 1188 MG/DL (ref 700–1600)
IGM SERPL-MCNC: 19 MG/DL (ref 26–217)
INTERPRETATION SERPL IEP-IMP: ABNORMAL
KAPPA LC SERPL-MCNC: 367.2 MG/L (ref 3.3–19.4)
KAPPA LC/LAMBDA SER: 39.91 {RATIO} (ref 0.26–1.65)
LAMBDA LC FREE SERPL-MCNC: 9.2 MG/L (ref 5.7–26.3)
Lab: ABNORMAL
M-SPIKE: 0.9 G/DL
PROT SERPL-MCNC: 6.5 G/DL (ref 6–8.5)

## 2017-07-07 RX ORDER — AMLODIPINE BESYLATE 10 MG/1
TABLET ORAL
Qty: 90 TABLET | Refills: 0 | Status: SHIPPED | OUTPATIENT
Start: 2017-07-07 | End: 2017-10-05 | Stop reason: SDUPTHER

## 2017-07-11 ENCOUNTER — OFFICE VISIT (OUTPATIENT)
Dept: ONCOLOGY | Facility: CLINIC | Age: 82
End: 2017-07-11

## 2017-07-11 ENCOUNTER — APPOINTMENT (OUTPATIENT)
Dept: LAB | Facility: HOSPITAL | Age: 82
End: 2017-07-11

## 2017-07-11 VITALS
HEART RATE: 72 BPM | RESPIRATION RATE: 16 BRPM | HEIGHT: 61 IN | SYSTOLIC BLOOD PRESSURE: 122 MMHG | BODY MASS INDEX: 19.63 KG/M2 | TEMPERATURE: 98 F | DIASTOLIC BLOOD PRESSURE: 60 MMHG | WEIGHT: 104 LBS

## 2017-07-11 DIAGNOSIS — D47.2 SMOLDERING MULTIPLE MYELOMA (SMM): Primary | ICD-10-CM

## 2017-07-11 DIAGNOSIS — D50.9 IRON DEFICIENCY ANEMIA, UNSPECIFIED IRON DEFICIENCY ANEMIA TYPE: ICD-10-CM

## 2017-07-11 PROCEDURE — 99214 OFFICE O/P EST MOD 30 MIN: CPT | Performed by: INTERNAL MEDICINE

## 2017-07-11 PROCEDURE — G0463 HOSPITAL OUTPT CLINIC VISIT: HCPCS | Performed by: INTERNAL MEDICINE

## 2017-07-11 RX ORDER — SODIUM CHLORIDE 9 MG/ML
250 INJECTION, SOLUTION INTRAVENOUS ONCE
Status: CANCELLED | OUTPATIENT
Start: 2017-07-18

## 2017-07-11 RX ORDER — SODIUM CHLORIDE 9 MG/ML
250 INJECTION, SOLUTION INTRAVENOUS ONCE
Status: CANCELLED | OUTPATIENT
Start: 2017-07-25

## 2017-07-11 NOTE — PROGRESS NOTES
Subjective .     REASONS FOR FOLLOWUP:  Smoldering multiple myeloma, anemia    HISTORY OF PRESENT ILLNESS:  The patient is a 87 y.o. year old female  who is here for follow-up with the above-mentioned history.    Denies fever or chills.  Denies unintentional significant weight loss  Denies drenching night sweats.  Denies pain other than chronic shoulder pain.      Past Medical History:   Diagnosis Date   • Anemia     Secondary to chronic renal insufficiency and folate insufficiency   • Cataract    • Cystic mass of pancreatic head     On CT of 01/16/2009. stable in 02/2011   • DVT (deep venous thrombosis) 08/2010    PICC-associated DVT resulting in removal of PICC   • Folate deficiency    • Frequent UTI    • Hypertension    • Hypogammaglobulinemia    • Left hip pain    • Myeloma     Smoldering myeloma versus multiple myeloma   • PICC (peripherally inserted central catheter) removal     Secondary to DVT   • Renal insufficiency     Chronic   • Small bowel perforation 07/2010    Small bowel and cecal perforation with abcess formation and infected mesh from prior hernia repair   • Temporal arteritis 11/10/2005   • Torn rotator cuff 2007     Past Surgical History:   Procedure Laterality Date   • BLADDER REPAIR  1995   • COLOSTOMY  01/2009    Patient had a ruptured sigmoid diverticulum which led to a diverting colostomy   • EYE SURGERY  04/2007    Cataract   • HERNIA REPAIR  04/26/2010    Ventral hernia repair from prior sigmoid resection   • HYSTERECTOMY  1995   • JOINT REPLACEMENT  2002    Bilateral knee replacements       HEMATOLOGIC/ONCOLOGIC HISTORY:  (History from previous dates can be found in the separate document.)    MEDICATIONS    Current Outpatient Prescriptions:   •  acetaminophen (TYLENOL) 500 MG tablet, Take 1 tablet by mouth Every 6 (Six) Hours As Needed for mild pain (1-3)., Disp: 30 tablet, Rfl: 0  •  amLODIPine (NORVASC) 10 MG tablet, TAKE 1 TABLET BY MOUTH ONE TIME A DAY , Disp: 90 tablet, Rfl: 0  •   B Complex Vitamins (VITAMIN B COMPLEX) capsule capsule, Take 1 capsule by mouth Daily., Disp: , Rfl:   •  bumetanide (BUMEX) 0.5 MG tablet, TAKE 1/2 TABLET BY MOUTH DAILY, Disp: 45 tablet, Rfl: 1  •  calcium-vitamin D (OSCAL-500) 500-200 MG-UNIT per tablet, Take 1 tablet by mouth., Disp: , Rfl:   •  carteolol (OCUPRESS) 1 % ophthalmic solution, Apply  to eye., Disp: , Rfl:   •  cholecalciferol (VITAMIN D3) 1000 UNITS tablet, Take 1,000 Units by mouth Daily., Disp: , Rfl:   •  denosumab (PROLIA) 60 MG/ML solution syringe, Inject  under the skin., Disp: , Rfl:   •  Multiple Vitamins-Calcium (ONE-A-DAY WOMENS PO), Take  by mouth., Disp: , Rfl:   •  ondansetron ODT (ZOFRAN ODT) 4 MG disintegrating tablet, Take 1 tablet by mouth Every 8 (Eight) Hours As Needed for nausea., Disp: 12 tablet, Rfl: 0    ALLERGIES:     Allergies   Allergen Reactions   • Sulfa Antibiotics        SOCIAL HISTORY:       Social History     Social History   • Marital status:      Spouse name: N/A   • Number of children: N/A   • Years of education: N/A     Occupational History   •  [employer]     Social History Main Topics   • Smoking status: Former Smoker   • Smokeless tobacco: Not on file   • Alcohol use No   • Drug use: Not on file   • Sexual activity: Not on file     Other Topics Concern   • Not on file     Social History Narrative    Spouse  from what sounds like cholangiocarcinoma.         FAMILY HISTORY:  Family History   Problem Relation Age of Onset   • Cancer Neg Hx        REVIEW OF SYSTEMS:  GENERAL: No change in appetite or weight;   No fevers, chills, sweats.    SKIN: No nonhealing lesions.   No rashes.  HEME/LYMPH: No easy bruising, bleeding.   No swollen nodes.   EYES: No vision changes or diplopia.   ENT: No tinnitus, hearing loss, gum bleeding, epistaxis, hoarseness or dysphagia.   RESPIRATORY: No cough, shortness of breath, hemoptysis or wheezing.   CVS: No chest pain, palpitations, orthopnea, dyspnea on  exertion or PND.   GI: No melena or hematochezia.   No abdominal pain.  No nausea, vomiting, constipation, diarrhea  : No lower tract obstructive symptoms, dysuria or hematuria.   MUSCULOSKELETAL: see HPI   NEUROLOGICAL: No global weakness, loss of consciousness or seizures.   PSYCHIATRIC: No increased nervousness, mood changes or depression.       Objective    There were no vitals filed for this visit.  Current Status 2/6/2017   ECOG score 1      PHYSICAL EXAM:    GENERAL:  Well-developed, well-nourished in no acute distress.   SKIN:  Warm, dry without rashes, purpura or petechiae.  HEAD:  Normocephalic.  EYES:  Pupils equal, round and reactive to light.  EOMs intact.  Conjunctivae normal.  EARS:  Hearing intact.  NOSE:  Septum midline.  No excoriations or nasal discharge.  MOUTH:  Tongue is well-papillated; no stomatitis or ulcers.  Lips normal.  THROAT:  Oropharynx without lesions or exudates.  NECK:  Supple with good range of motion; no thyromegaly or masses, no JVD.  LYMPHATICS:  No cervical, supraclavicular, axillary or inguinal adenopathy.  CHEST:  Lungs clear to percussion and auscultation. Good airflow.  CARDIAC:  Regular rate and rhythm without murmurs, rubs or gallops. Normal S1,S2.  ABDOMEN:  Soft, nontender with no organomegaly or masses.  EXTREMITIES:  No clubbing, cyanosis or edema.  NEUROLOGICAL:  Cranial Nerves II-XII grossly intact.  No focal neurological deficits.  PSYCHIATRIC:  Normal affect and mood.      RECENT LABS:        WBC   Date/Time Value Ref Range Status   06/27/2017 01:30 PM 4.35 4.00 - 10.00 10*3/mm3 Final     Hemoglobin   Date/Time Value Ref Range Status   06/27/2017 01:30 PM 9.9 (L) 11.5 - 14.9 g/dL Final     Platelets   Date/Time Value Ref Range Status   06/27/2017 01:30  150 - 375 10*3/mm3 Final       Assessment/Plan     ASSESSMENT:  1.  Smoldering myeloma.  Bone marrow 3/31/11 revealed 10% monoclonal IgG kappa plasma cells.  Main issue is anemia.  She responds to Procrit  (that has not needed this for quite some time).   M spike stable at 0.9.    2.  Iron deficiency anemia.  History of IV iron.  (In the past, difficulty tolerating by mouth iron).  However, has been on iron 3 times per week since the fall 2012.  Hemoglobin is stable.    Recent iron labs low.  Arrange 2 doses Feraheme.    3.  Anemia of chronic renal insufficiency, stage III.  She likes to minimize Procrit.  Has not received Procrit for quite some time.  Sees Dr. Federico Cagle.    4.  PICC associated DVT August 2010.  Completed 6 months Coumadin 3/20/11.  (If treatment for myeloma is given, will need to keep this in mind, since she had a clot in the past).    5.  Cystic mass on pancreatic head on CT 1/16/09.  Completed 2 years of CAT scan follow-up 02/02/11.  Plan no more routine CAT scans.    PLAN:  · M.D. 6 months.  2 weeks prior:  · Serum protein studies, iron labs, CBC, BMP  · She declines any more urine studies  · She wants M.D. follow-up no more often than every 6 months  · She prefers bone surveys every 2 years  · Last bone survey 1/23/17, no lytic lesions.   · 2 doses feraheme

## 2017-07-14 DIAGNOSIS — D50.9 IRON DEFICIENCY ANEMIA, UNSPECIFIED IRON DEFICIENCY ANEMIA TYPE: ICD-10-CM

## 2017-07-14 PROBLEM — T45.4X5A IRON AND ITS COMPOUNDS CAUSING ADVERSE EFFECT IN THERAPEUTIC USE: Status: ACTIVE | Noted: 2017-07-14

## 2017-07-14 RX ORDER — DIPHENHYDRAMINE HCL 25 MG
25 CAPSULE ORAL ONCE
Status: CANCELLED | OUTPATIENT
Start: 2017-07-18

## 2017-07-14 RX ORDER — FAMOTIDINE 10 MG/ML
20 INJECTION, SOLUTION INTRAVENOUS ONCE
Status: CANCELLED | OUTPATIENT
Start: 2017-07-18

## 2017-07-14 RX ORDER — DIPHENHYDRAMINE HCL 25 MG
25 CAPSULE ORAL ONCE
Status: CANCELLED | OUTPATIENT
Start: 2017-07-25

## 2017-07-18 ENCOUNTER — INFUSION (OUTPATIENT)
Dept: ONCOLOGY | Facility: HOSPITAL | Age: 82
End: 2017-07-18

## 2017-07-18 VITALS
SYSTOLIC BLOOD PRESSURE: 161 MMHG | TEMPERATURE: 98.2 F | HEART RATE: 59 BPM | WEIGHT: 101.8 LBS | BODY MASS INDEX: 18.97 KG/M2 | DIASTOLIC BLOOD PRESSURE: 72 MMHG

## 2017-07-18 DIAGNOSIS — IMO0001 IRON AND ITS COMPOUNDS CAUSING ADVERSE EFFECT IN THERAPEUTIC USE, SUBSEQUENT ENCOUNTER: Primary | ICD-10-CM

## 2017-07-18 DIAGNOSIS — D50.9 IRON DEFICIENCY ANEMIA, UNSPECIFIED IRON DEFICIENCY ANEMIA TYPE: ICD-10-CM

## 2017-07-18 PROCEDURE — 25010000002 FERUMOXYTOL 510 MG/17ML SOLUTION 510 MG VIAL: Performed by: INTERNAL MEDICINE

## 2017-07-18 PROCEDURE — 96375 TX/PRO/DX INJ NEW DRUG ADDON: CPT

## 2017-07-18 PROCEDURE — 63710000001 DIPHENHYDRAMINE PER 50 MG: Performed by: INTERNAL MEDICINE

## 2017-07-18 PROCEDURE — 96374 THER/PROPH/DIAG INJ IV PUSH: CPT

## 2017-07-18 RX ORDER — SODIUM CHLORIDE 9 MG/ML
250 INJECTION, SOLUTION INTRAVENOUS ONCE
Status: COMPLETED | OUTPATIENT
Start: 2017-07-18 | End: 2017-07-18

## 2017-07-18 RX ORDER — DIPHENHYDRAMINE HCL 25 MG
25 CAPSULE ORAL ONCE
Status: COMPLETED | OUTPATIENT
Start: 2017-07-18 | End: 2017-07-18

## 2017-07-18 RX ORDER — FAMOTIDINE 10 MG/ML
20 INJECTION, SOLUTION INTRAVENOUS ONCE
Status: COMPLETED | OUTPATIENT
Start: 2017-07-18 | End: 2017-07-18

## 2017-07-18 RX ADMIN — FERUMOXYTOL 510 MG: 510 INJECTION INTRAVENOUS at 14:46

## 2017-07-18 RX ADMIN — SODIUM CHLORIDE 250 ML: 900 INJECTION, SOLUTION INTRAVENOUS at 14:18

## 2017-07-18 RX ADMIN — FAMOTIDINE 20 MG: 10 INJECTION, SOLUTION INTRAVENOUS at 14:18

## 2017-07-18 RX ADMIN — DIPHENHYDRAMINE HYDROCHLORIDE 25 MG: 25 CAPSULE ORAL at 14:18

## 2017-07-25 ENCOUNTER — INFUSION (OUTPATIENT)
Dept: ONCOLOGY | Facility: HOSPITAL | Age: 82
End: 2017-07-25

## 2017-07-25 VITALS
HEART RATE: 76 BPM | BODY MASS INDEX: 19.31 KG/M2 | TEMPERATURE: 98 F | WEIGHT: 103.6 LBS | DIASTOLIC BLOOD PRESSURE: 63 MMHG | SYSTOLIC BLOOD PRESSURE: 150 MMHG

## 2017-07-25 DIAGNOSIS — IMO0001 IRON AND ITS COMPOUNDS CAUSING ADVERSE EFFECT IN THERAPEUTIC USE, SUBSEQUENT ENCOUNTER: Primary | ICD-10-CM

## 2017-07-25 DIAGNOSIS — D50.9 IRON DEFICIENCY ANEMIA, UNSPECIFIED IRON DEFICIENCY ANEMIA TYPE: ICD-10-CM

## 2017-07-25 PROCEDURE — 63710000001 DIPHENHYDRAMINE PER 50 MG: Performed by: INTERNAL MEDICINE

## 2017-07-25 PROCEDURE — 25010000002 FERUMOXYTOL 510 MG/17ML SOLUTION 510 MG VIAL: Performed by: NURSE PRACTITIONER

## 2017-07-25 PROCEDURE — 96374 THER/PROPH/DIAG INJ IV PUSH: CPT | Performed by: NURSE PRACTITIONER

## 2017-07-25 RX ORDER — SODIUM CHLORIDE 9 MG/ML
250 INJECTION, SOLUTION INTRAVENOUS ONCE
Status: COMPLETED | OUTPATIENT
Start: 2017-07-25 | End: 2017-07-25

## 2017-07-25 RX ORDER — DIPHENHYDRAMINE HCL 25 MG
25 CAPSULE ORAL ONCE
Status: COMPLETED | OUTPATIENT
Start: 2017-07-25 | End: 2017-07-25

## 2017-07-25 RX ADMIN — DIPHENHYDRAMINE HYDROCHLORIDE 25 MG: 25 CAPSULE ORAL at 14:44

## 2017-07-25 RX ADMIN — SODIUM CHLORIDE 250 ML: 900 INJECTION, SOLUTION INTRAVENOUS at 14:44

## 2017-07-25 RX ADMIN — FERUMOXYTOL 510 MG: 510 INJECTION INTRAVENOUS at 15:09

## 2017-10-06 RX ORDER — AMLODIPINE BESYLATE 10 MG/1
TABLET ORAL
Qty: 90 TABLET | Refills: 0 | Status: SHIPPED | OUTPATIENT
Start: 2017-10-06 | End: 2018-01-02 | Stop reason: SDUPTHER

## 2017-10-23 ENCOUNTER — OFFICE VISIT (OUTPATIENT)
Dept: INTERNAL MEDICINE | Facility: CLINIC | Age: 82
End: 2017-10-23

## 2017-10-23 VITALS
HEIGHT: 61 IN | DIASTOLIC BLOOD PRESSURE: 80 MMHG | OXYGEN SATURATION: 94 % | RESPIRATION RATE: 16 BRPM | TEMPERATURE: 97.1 F | SYSTOLIC BLOOD PRESSURE: 144 MMHG | HEART RATE: 61 BPM | BODY MASS INDEX: 19.45 KG/M2 | WEIGHT: 103 LBS

## 2017-10-23 DIAGNOSIS — N18.30 STAGE 3 CHRONIC KIDNEY DISEASE (HCC): ICD-10-CM

## 2017-10-23 DIAGNOSIS — I10 ESSENTIAL HYPERTENSION: Primary | ICD-10-CM

## 2017-10-23 DIAGNOSIS — M81.0 OSTEOPOROSIS, UNSPECIFIED OSTEOPOROSIS TYPE, UNSPECIFIED PATHOLOGICAL FRACTURE PRESENCE: ICD-10-CM

## 2017-10-23 PROCEDURE — 99213 OFFICE O/P EST LOW 20 MIN: CPT | Performed by: INTERNAL MEDICINE

## 2017-10-23 NOTE — PROGRESS NOTES
Subjective   Raina Jackson is a 88 y.o. female.   10/5/2017  Wt Readings from Last 1 Encounters:   10/23/17 103 lb (46.7 kg)   She was last seen in April 2017, weight 103 pounds then, unchanged.    She returns for follow-up of hypertension, chronic kidney disease, chronic anemia, osteoporosis    History of Present Illness   She has hypertension treated with amlodipine 10 mg daily and Bumex 0.5 mg daily.  She does not have any shortness of air or known cardiac problems.    She has osteoporosis treated with Prolia injections at six-month intervals, next due in November.  She also takes calcium and vitamin D supplements.    She has chronic anemia of mixed etiology.  She is followed by Dr. wade of [facility] and is known to have multiple myeloma as well as iron deficiency.  B12 levels been normal.    She has chronic bilateral shoulder problems with rotator cuff tears.  This is not felt to be operable.  She cannot raise her arms above shoulder level.    She has chronic kidney disease which is being followed up.  Her last serum creatinine level was 1.53, estimated GFR 32 mL per minute.  No uremic symptoms.  She is been advised to avoid arthritis medications.  The following portions of the patient's history were reviewed and updated as appropriate: allergies, current medications, past family history, past medical history, past social history, past surgical history and problem list.    Review of Systems   Constitutional: Negative.    HENT: Negative.    Eyes: Negative.    Respiratory: Negative.    Cardiovascular: Negative.    Endocrine: Negative.    Genitourinary: Negative.    Musculoskeletal: Positive for arthralgias and myalgias.   Skin: Negative.    Neurological: Negative.    Hematological: Negative.    Psychiatric/Behavioral: Negative.        Objective   Physical Exam   Constitutional: She appears well-developed and well-nourished.   HENT:   Head: Normocephalic and atraumatic.   Cardiovascular: Normal rate and regular  rhythm.  Exam reveals no gallop and no friction rub.    No murmur heard.  Pulmonary/Chest: Effort normal and breath sounds normal. No respiratory distress. She has no wheezes. She has no rales.   Abdominal: Soft. Bowel sounds are normal. She exhibits no distension and no mass. There is no tenderness.   Musculoskeletal:   Decreased range of motion bilateral shoulders   Neurological: She is alert.   Skin: Skin is warm.   Psychiatric: Her behavior is normal.   Vitals reviewed.      Assessment/Plan   Raina was seen today for hypertension, chronic kidney disease, osteoporosis and anemia.    Diagnoses and all orders for this visit:    Essential hypertension  Comments:  Blood pressure 144/80, satisfactory, continue amlodipine 10 mg daily and Bumex 0.5 mg daily    Stage 3 chronic kidney disease  Comments:  No uremic symptoms, she will need BMP next month before Prolia injections    Osteoporosis, unspecified osteoporosis type, unspecified pathological fracture presence  Comments:  Continue Prolia injections every 6 months, continue Os-Erwin D twice a day      Multiple myeloma, followed by [facility]    Anemia of mixed origin, followed by [facility]           Schedule office follow-up about 6 months, return sooner if needed              EMR Dragon/Transcription disclaimer:      Much of this encounter note is an electronic transcription/translation of spoken language to printed text. The electronic translation of spoken language may permit erroneous, or at times, nonsensical words or phrases to be inadvertently transcribed; Although I have reviewed the note for such errors, some may still exist.

## 2017-11-21 RX ORDER — BUMETANIDE 0.5 MG/1
TABLET ORAL
Qty: 45 TABLET | Refills: 0 | Status: SHIPPED | OUTPATIENT
Start: 2017-11-21 | End: 2018-01-01 | Stop reason: SDUPTHER

## 2017-11-22 DIAGNOSIS — M81.0 OSTEOPOROSIS, UNSPECIFIED OSTEOPOROSIS TYPE, UNSPECIFIED PATHOLOGICAL FRACTURE PRESENCE: Primary | ICD-10-CM

## 2017-11-27 ENCOUNTER — HOSPITAL ENCOUNTER (OUTPATIENT)
Dept: INFUSION THERAPY | Facility: HOSPITAL | Age: 82
Discharge: HOME | End: 2017-11-27
Attending: INTERNAL MEDICINE | Admitting: INTERNAL MEDICINE

## 2017-11-27 ENCOUNTER — RESULTS ENCOUNTER (OUTPATIENT)
Dept: INTERNAL MEDICINE | Facility: CLINIC | Age: 82
End: 2017-11-27

## 2017-11-27 VITALS
OXYGEN SATURATION: 97 % | TEMPERATURE: 94.5 F | RESPIRATION RATE: 16 BRPM | SYSTOLIC BLOOD PRESSURE: 157 MMHG | HEART RATE: 86 BPM | DIASTOLIC BLOOD PRESSURE: 58 MMHG

## 2017-11-27 DIAGNOSIS — M80.00XA AGE-RELATED OSTEOPOROSIS WITH CURRENT PATHOLOGICAL FRACTURE, INITIAL ENCOUNTER: ICD-10-CM

## 2017-11-27 DIAGNOSIS — M81.0 OSTEOPOROSIS, UNSPECIFIED OSTEOPOROSIS TYPE, UNSPECIFIED PATHOLOGICAL FRACTURE PRESENCE: ICD-10-CM

## 2017-11-27 LAB
ANION GAP SERPL CALCULATED.3IONS-SCNC: 10.6 MMOL/L
BUN BLD-MCNC: 39 MG/DL (ref 8–23)
BUN/CREAT SERPL: 27.1 (ref 7–25)
CALCIUM SPEC-SCNC: 10.5 MG/DL (ref 8.6–10.5)
CHLORIDE SERPL-SCNC: 108 MMOL/L (ref 98–107)
CO2 SERPL-SCNC: 23.4 MMOL/L (ref 22–29)
CREAT BLD-MCNC: 1.44 MG/DL (ref 0.57–1)
GFR SERPL CREATININE-BSD FRML MDRD: 34 ML/MIN/1.73
GLUCOSE BLD-MCNC: 85 MG/DL (ref 65–99)
POTASSIUM BLD-SCNC: 4.4 MMOL/L (ref 3.5–5.2)
SODIUM BLD-SCNC: 142 MMOL/L (ref 136–145)

## 2017-11-27 PROCEDURE — 96372 THER/PROPH/DIAG INJ SC/IM: CPT

## 2017-11-27 PROCEDURE — 80048 BASIC METABOLIC PNL TOTAL CA: CPT | Performed by: INTERNAL MEDICINE

## 2017-11-27 PROCEDURE — 25010000002 DENOSUMAB 60 MG/ML SOLUTION: Performed by: INTERNAL MEDICINE

## 2017-11-27 PROCEDURE — 36415 COLL VENOUS BLD VENIPUNCTURE: CPT

## 2017-11-27 RX ADMIN — DENOSUMAB 60 MG: 60 INJECTION SUBCUTANEOUS at 11:30

## 2017-12-19 ENCOUNTER — TELEPHONE (OUTPATIENT)
Dept: INTERNAL MEDICINE | Facility: CLINIC | Age: 82
End: 2017-12-19

## 2018-01-01 ENCOUNTER — OFFICE VISIT (OUTPATIENT)
Dept: ONCOLOGY | Facility: CLINIC | Age: 83
End: 2018-01-01

## 2018-01-01 ENCOUNTER — TELEPHONE (OUTPATIENT)
Dept: GENERAL RADIOLOGY | Facility: HOSPITAL | Age: 83
End: 2018-01-01

## 2018-01-01 ENCOUNTER — OFFICE VISIT (OUTPATIENT)
Dept: INTERNAL MEDICINE | Facility: CLINIC | Age: 83
End: 2018-01-01

## 2018-01-01 ENCOUNTER — APPOINTMENT (OUTPATIENT)
Dept: LAB | Facility: HOSPITAL | Age: 83
End: 2018-01-01

## 2018-01-01 ENCOUNTER — LAB (OUTPATIENT)
Dept: OTHER | Facility: HOSPITAL | Age: 83
End: 2018-01-01

## 2018-01-01 ENCOUNTER — LAB (OUTPATIENT)
Dept: ONCOLOGY | Facility: HOSPITAL | Age: 83
End: 2018-01-01

## 2018-01-01 ENCOUNTER — APPOINTMENT (OUTPATIENT)
Dept: ONCOLOGY | Facility: HOSPITAL | Age: 83
End: 2018-01-01

## 2018-01-01 ENCOUNTER — INFUSION (OUTPATIENT)
Dept: ONCOLOGY | Facility: HOSPITAL | Age: 83
End: 2018-01-01

## 2018-01-01 ENCOUNTER — TELEPHONE (OUTPATIENT)
Dept: INTERNAL MEDICINE | Facility: CLINIC | Age: 83
End: 2018-01-01

## 2018-01-01 ENCOUNTER — HOSPITAL ENCOUNTER (OUTPATIENT)
Dept: INFUSION THERAPY | Facility: HOSPITAL | Age: 83
Discharge: HOME | End: 2018-05-29
Admitting: INTERNAL MEDICINE

## 2018-01-01 ENCOUNTER — HOSPITAL ENCOUNTER (OUTPATIENT)
Dept: PET IMAGING | Facility: HOSPITAL | Age: 83
Discharge: HOME | End: 2018-12-11
Attending: INTERNAL MEDICINE | Admitting: INTERNAL MEDICINE

## 2018-01-01 ENCOUNTER — TELEPHONE (OUTPATIENT)
Dept: ONCOLOGY | Facility: HOSPITAL | Age: 83
End: 2018-01-01

## 2018-01-01 ENCOUNTER — LAB (OUTPATIENT)
Dept: LAB | Facility: HOSPITAL | Age: 83
End: 2018-01-01

## 2018-01-01 ENCOUNTER — APPOINTMENT (OUTPATIENT)
Dept: GENERAL RADIOLOGY | Facility: HOSPITAL | Age: 83
End: 2018-01-01

## 2018-01-01 ENCOUNTER — HOSPITAL ENCOUNTER (OUTPATIENT)
Dept: GENERAL RADIOLOGY | Facility: HOSPITAL | Age: 83
Discharge: HOME | End: 2018-07-06
Attending: INTERNAL MEDICINE | Admitting: INTERNAL MEDICINE

## 2018-01-01 ENCOUNTER — APPOINTMENT (OUTPATIENT)
Dept: ONCOLOGY | Facility: CLINIC | Age: 83
End: 2018-01-01

## 2018-01-01 ENCOUNTER — HOSPITAL ENCOUNTER (OUTPATIENT)
Dept: PET IMAGING | Facility: HOSPITAL | Age: 83
Discharge: HOME | End: 2018-12-11
Attending: INTERNAL MEDICINE

## 2018-01-01 ENCOUNTER — HOSPITAL ENCOUNTER (EMERGENCY)
Facility: HOSPITAL | Age: 83
Discharge: HOME | End: 2018-06-04
Attending: EMERGENCY MEDICINE | Admitting: NURSE PRACTITIONER

## 2018-01-01 ENCOUNTER — APPOINTMENT (OUTPATIENT)
Dept: CT IMAGING | Facility: HOSPITAL | Age: 83
End: 2018-01-01

## 2018-01-01 ENCOUNTER — RESULTS ENCOUNTER (OUTPATIENT)
Dept: INTERNAL MEDICINE | Facility: CLINIC | Age: 83
End: 2018-01-01

## 2018-01-01 ENCOUNTER — DOCUMENTATION (OUTPATIENT)
Dept: ONCOLOGY | Facility: CLINIC | Age: 83
End: 2018-01-01

## 2018-01-01 VITALS
HEIGHT: 60 IN | RESPIRATION RATE: 16 BRPM | TEMPERATURE: 97.7 F | OXYGEN SATURATION: 98 % | WEIGHT: 100 LBS | DIASTOLIC BLOOD PRESSURE: 71 MMHG | HEART RATE: 91 BPM | BODY MASS INDEX: 19.63 KG/M2 | SYSTOLIC BLOOD PRESSURE: 132 MMHG

## 2018-01-01 VITALS
SYSTOLIC BLOOD PRESSURE: 188 MMHG | HEART RATE: 82 BPM | TEMPERATURE: 96.2 F | HEIGHT: 60 IN | WEIGHT: 96.2 LBS | DIASTOLIC BLOOD PRESSURE: 77 MMHG | OXYGEN SATURATION: 100 % | BODY MASS INDEX: 18.89 KG/M2

## 2018-01-01 VITALS
HEIGHT: 60 IN | BODY MASS INDEX: 18.65 KG/M2 | RESPIRATION RATE: 16 BRPM | DIASTOLIC BLOOD PRESSURE: 71 MMHG | WEIGHT: 95 LBS | SYSTOLIC BLOOD PRESSURE: 146 MMHG | OXYGEN SATURATION: 100 % | HEART RATE: 85 BPM | TEMPERATURE: 98.7 F

## 2018-01-01 VITALS
OXYGEN SATURATION: 100 % | DIASTOLIC BLOOD PRESSURE: 83 MMHG | BODY MASS INDEX: 18.65 KG/M2 | WEIGHT: 95 LBS | RESPIRATION RATE: 16 BRPM | HEART RATE: 83 BPM | HEIGHT: 60 IN | TEMPERATURE: 97.5 F | SYSTOLIC BLOOD PRESSURE: 176 MMHG

## 2018-01-01 VITALS
TEMPERATURE: 97.7 F | HEIGHT: 60 IN | DIASTOLIC BLOOD PRESSURE: 70 MMHG | HEART RATE: 87 BPM | SYSTOLIC BLOOD PRESSURE: 142 MMHG | WEIGHT: 95 LBS | BODY MASS INDEX: 18.65 KG/M2 | OXYGEN SATURATION: 97 % | RESPIRATION RATE: 16 BRPM

## 2018-01-01 VITALS
HEIGHT: 60 IN | BODY MASS INDEX: 19.24 KG/M2 | RESPIRATION RATE: 16 BRPM | WEIGHT: 98 LBS | HEART RATE: 87 BPM | TEMPERATURE: 97.4 F | SYSTOLIC BLOOD PRESSURE: 160 MMHG | OXYGEN SATURATION: 99 % | DIASTOLIC BLOOD PRESSURE: 73 MMHG

## 2018-01-01 VITALS — WEIGHT: 95.2 LBS | BODY MASS INDEX: 18.59 KG/M2

## 2018-01-01 VITALS
DIASTOLIC BLOOD PRESSURE: 80 MMHG | WEIGHT: 101.6 LBS | TEMPERATURE: 97.4 F | SYSTOLIC BLOOD PRESSURE: 130 MMHG | BODY MASS INDEX: 19.18 KG/M2 | HEIGHT: 61 IN | OXYGEN SATURATION: 98 % | RESPIRATION RATE: 16 BRPM | HEART RATE: 107 BPM

## 2018-01-01 VITALS
WEIGHT: 108.2 LBS | OXYGEN SATURATION: 95 % | SYSTOLIC BLOOD PRESSURE: 130 MMHG | DIASTOLIC BLOOD PRESSURE: 80 MMHG | HEIGHT: 61 IN | HEART RATE: 83 BPM | TEMPERATURE: 96.8 F | RESPIRATION RATE: 16 BRPM | BODY MASS INDEX: 20.43 KG/M2

## 2018-01-01 VITALS
DIASTOLIC BLOOD PRESSURE: 66 MMHG | OXYGEN SATURATION: 97 % | RESPIRATION RATE: 18 BRPM | WEIGHT: 97.6 LBS | TEMPERATURE: 98.9 F | HEART RATE: 83 BPM | SYSTOLIC BLOOD PRESSURE: 155 MMHG | BODY MASS INDEX: 19.06 KG/M2

## 2018-01-01 VITALS — DIASTOLIC BLOOD PRESSURE: 81 MMHG | HEART RATE: 77 BPM | SYSTOLIC BLOOD PRESSURE: 153 MMHG

## 2018-01-01 VITALS — SYSTOLIC BLOOD PRESSURE: 190 MMHG | HEART RATE: 71 BPM | DIASTOLIC BLOOD PRESSURE: 82 MMHG
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
## 2018-01-01 DIAGNOSIS — E83.52 SERUM CALCIUM ELEVATED: ICD-10-CM

## 2018-01-01 DIAGNOSIS — M25.552 PAIN OF LEFT HIP JOINT: ICD-10-CM

## 2018-01-01 DIAGNOSIS — S81.011A KNEE LACERATION, RIGHT, INITIAL ENCOUNTER: ICD-10-CM

## 2018-01-01 DIAGNOSIS — E83.52 HYPERCALCEMIA: ICD-10-CM

## 2018-01-01 DIAGNOSIS — E03.9 ACQUIRED HYPOTHYROIDISM: ICD-10-CM

## 2018-01-01 DIAGNOSIS — C90.00 MULTIPLE MYELOMA NOT HAVING ACHIEVED REMISSION (HCC): ICD-10-CM

## 2018-01-01 DIAGNOSIS — D47.2 SMOLDERING MULTIPLE MYELOMA (SMM): ICD-10-CM
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
## 2018-01-01 DIAGNOSIS — D50.9 IRON DEFICIENCY ANEMIA, UNSPECIFIED IRON DEFICIENCY ANEMIA TYPE: ICD-10-CM

## 2018-01-01 DIAGNOSIS — I87.2 VENOUS INSUFFICIENCY: ICD-10-CM

## 2018-01-01 DIAGNOSIS — C90.00 MULTIPLE MYELOMA NOT HAVING ACHIEVED REMISSION (HCC): Primary | ICD-10-CM

## 2018-01-01 DIAGNOSIS — S51.812A FOREARM LACERATION, LEFT, INITIAL ENCOUNTER: ICD-10-CM

## 2018-01-01 DIAGNOSIS — N18.9 CHRONIC KIDNEY DISEASE, UNSPECIFIED CKD STAGE: ICD-10-CM

## 2018-01-01 DIAGNOSIS — M19.90 ARTHRITIS: Primary | Chronic | ICD-10-CM

## 2018-01-01 DIAGNOSIS — E83.52 SERUM CALCIUM ELEVATED: Primary | ICD-10-CM

## 2018-01-01 DIAGNOSIS — I10 ESSENTIAL HYPERTENSION: Primary | ICD-10-CM

## 2018-01-01 DIAGNOSIS — M80.00XA AGE-RELATED OSTEOPOROSIS WITH CURRENT PATHOLOGICAL FRACTURE, INITIAL ENCOUNTER: ICD-10-CM

## 2018-01-01 DIAGNOSIS — S00.81XA ABRASION OF FOREHEAD, INITIAL ENCOUNTER: ICD-10-CM

## 2018-01-01 DIAGNOSIS — N18.30 STAGE 3 CHRONIC KIDNEY DISEASE (HCC): ICD-10-CM

## 2018-01-01 DIAGNOSIS — R60.9 EDEMA, UNSPECIFIED TYPE: ICD-10-CM

## 2018-01-01 DIAGNOSIS — S00.12XA PERIORBITAL ECCHYMOSIS OF LEFT EYE, INITIAL ENCOUNTER: ICD-10-CM

## 2018-01-01 DIAGNOSIS — T14.8XXA ABRASION: Primary | ICD-10-CM

## 2018-01-01 DIAGNOSIS — S09.90XA CLOSED HEAD INJURY, INITIAL ENCOUNTER: ICD-10-CM

## 2018-01-01 LAB
ALBUMIN 24H MFR UR ELPH: 64.3 %
ALBUMIN SERPL-MCNC: 3.8 G/DL (ref 2.9–4.4)
ALBUMIN SERPL-MCNC: 3.8 G/DL (ref 3.5–5.2)
ALBUMIN SERPL-MCNC: 3.9 G/DL (ref 3.5–5.2)
ALBUMIN SERPL-MCNC: 4.1 G/DL (ref 3.5–5.2)
ALBUMIN/GLOB SERPL: 1.4 {RATIO} (ref 0.7–1.7)
ALBUMIN/GLOB SERPL: 1.5 G/DL
ALBUMIN/GLOB SERPL: 1.5 G/DL (ref 1.1–2.4)
ALBUMIN/GLOB SERPL: 1.7 G/DL
ALP SERPL-CCNC: 50 U/L (ref 39–117)
ALP SERPL-CCNC: 50 U/L (ref 39–117)
ALP SERPL-CCNC: 57 U/L (ref 38–116)
ALPHA1 GLOB 24H MFR UR ELPH: 6.2 %
ALPHA1 GLOB FLD ELPH-MCNC: 0.3 G/DL (ref 0–0.4)
ALPHA2 GLOB 24H MFR UR ELPH: 5.5 %
ALPHA2 GLOB SERPL ELPH-MCNC: 0.7 G/DL (ref 0.4–1)
ALT SERPL W P-5'-P-CCNC: 26 U/L (ref 0–33)
ALT SERPL-CCNC: 15 U/L (ref 1–33)
ALT SERPL-CCNC: 19 U/L (ref 1–33)
ANION GAP SERPL CALCULATED.3IONS-SCNC: 12.7 MMOL/L
ANION GAP SERPL CALCULATED.3IONS-SCNC: 13 MMOL/L
ANION GAP SERPL CALCULATED.3IONS-SCNC: 13.2 MMOL/L
AST SERPL-CCNC: 24 U/L (ref 1–32)
AST SERPL-CCNC: 28 U/L (ref 1–32)
AST SERPL-CCNC: 29 U/L (ref 0–32)
B-GLOBULIN MFR UR ELPH: 9.7 %
B-GLOBULIN SERPL ELPH-MCNC: 0.7 G/DL (ref 0.7–1.3)
BASOPHILS # BLD AUTO: 0.01 10*3/MM3 (ref 0–0.1)
BASOPHILS # BLD AUTO: 0.01 10*3/MM3 (ref 0–0.2)
BASOPHILS # BLD AUTO: 0.02 10*3/MM3 (ref 0–0.1)
BASOPHILS # BLD AUTO: 0.02 10*3/MM3 (ref 0–0.1)
BASOPHILS NFR BLD AUTO: 0.2 % (ref 0–1.1)
BASOPHILS NFR BLD AUTO: 0.2 % (ref 0–1.5)
BASOPHILS NFR BLD AUTO: 0.4 % (ref 0–1.1)
BASOPHILS NFR BLD AUTO: 0.4 % (ref 0–1.1)
BILIRUB SERPL-MCNC: 0.2 MG/DL (ref 0.1–1.2)
BILIRUB SERPL-MCNC: 0.2 MG/DL (ref 0.1–1.2)
BILIRUB SERPL-MCNC: <0.2 MG/DL (ref 0.1–1.2)
BUN BLD-MCNC: 43 MG/DL (ref 6–20)
BUN BLD-MCNC: 49 MG/DL (ref 6–20)
BUN BLD-MCNC: 60 MG/DL (ref 8–23)
BUN SERPL-MCNC: 45 MG/DL (ref 8–23)
BUN SERPL-MCNC: 52 MG/DL (ref 8–23)
BUN/CREAT SERPL: 18.6 (ref 7.3–30)
BUN/CREAT SERPL: 20.1 (ref 7–25)
BUN/CREAT SERPL: 23.5 (ref 7.3–30)
BUN/CREAT SERPL: 26.9 (ref 7–25)
BUN/CREAT SERPL: 28.1 (ref 7–25)
CA-I SERPL ISE-MCNC: 6.1 MG/DL (ref 4.5–5.6)
CALCIUM SERPL-MCNC: 10 MG/DL (ref 8.6–10.5)
CALCIUM SERPL-MCNC: 10.4 MG/DL (ref 8.6–10.5)
CALCIUM SERPL-MCNC: 11.2 MG/DL (ref 8.6–10.5)
CALCIUM SPEC-SCNC: 11.6 MG/DL (ref 8.5–10.2)
CALCIUM SPEC-SCNC: 8.2 MG/DL (ref 8.5–10.2)
CALCIUM SPEC-SCNC: 8.6 MG/DL (ref 8.6–10.5)
CHLORIDE SERPL-SCNC: 100 MMOL/L (ref 98–107)
CHLORIDE SERPL-SCNC: 108 MMOL/L (ref 98–107)
CHLORIDE SERPL-SCNC: 110 MMOL/L (ref 98–107)
CHLORIDE SERPL-SCNC: 112 MMOL/L (ref 98–107)
CHLORIDE SERPL-SCNC: 113 MMOL/L (ref 98–107)
CO2 SERPL-SCNC: 17.8 MMOL/L (ref 22–29)
CO2 SERPL-SCNC: 19 MMOL/L (ref 22–29)
CO2 SERPL-SCNC: 24.3 MMOL/L (ref 22–29)
CO2 SERPL-SCNC: 24.4 MMOL/L (ref 22–29)
CO2 SERPL-SCNC: 28.5 MMOL/L (ref 22–29)
CREAT BLD-MCNC: 1.83 MG/DL (ref 0.6–1.1)
CREAT BLD-MCNC: 2.63 MG/DL (ref 0.6–1.1)
CREAT BLD-MCNC: 2.99 MG/DL (ref 0.57–1)
CREAT SERPL-MCNC: 1.6 MG/DL (ref 0.57–1)
CREAT SERPL-MCNC: 1.93 MG/DL (ref 0.57–1)
DEPRECATED RDW RBC AUTO: 55.8 FL (ref 37–49)
DEPRECATED RDW RBC AUTO: 56.6 FL (ref 37–49)
DEPRECATED RDW RBC AUTO: 59.3 FL (ref 37–54)
DEPRECATED RDW RBC AUTO: 60.8 FL (ref 37–49)
EOSINOPHIL # BLD AUTO: 0.03 10*3/MM3 (ref 0–0.7)
EOSINOPHIL # BLD AUTO: 0.04 10*3/MM3 (ref 0–0.36)
EOSINOPHIL # BLD AUTO: 0.06 10*3/MM3 (ref 0–0.36)
EOSINOPHIL # BLD AUTO: 0.11 10*3/MM3 (ref 0–0.36)
EOSINOPHIL NFR BLD AUTO: 0.6 % (ref 0.3–6.2)
EOSINOPHIL NFR BLD AUTO: 0.8 % (ref 1–5)
EOSINOPHIL NFR BLD AUTO: 1.3 % (ref 1–5)
EOSINOPHIL NFR BLD AUTO: 2.1 % (ref 1–5)
ERYTHROCYTE [DISTWIDTH] IN BLOOD BY AUTOMATED COUNT: 15.8 % (ref 11.7–14.5)
ERYTHROCYTE [DISTWIDTH] IN BLOOD BY AUTOMATED COUNT: 15.9 % (ref 11.7–14.5)
ERYTHROCYTE [DISTWIDTH] IN BLOOD BY AUTOMATED COUNT: 16.9 % (ref 11.7–13)
ERYTHROCYTE [DISTWIDTH] IN BLOOD BY AUTOMATED COUNT: 17.2 % (ref 11.7–14.5)
FERRITIN SERPL-MCNC: 50.8 NG/ML
FREE KAPPA LT CHAINS, 24HR: 501 MG/24 HR
FREE LAMBDA LT CHAINS, 24 HR: 4 MG/24 HR
GAMMA GLOB 24H MFR UR ELPH: 14.3 %
GAMMA GLOB SERPL ELPH-MCNC: 1.1 G/DL (ref 0.4–1.8)
GFR SERPL CREATININE-BSD FRML MDRD: 15 ML/MIN/1.73
GFR SERPL CREATININE-BSD FRML MDRD: 17 ML/MIN/1.73
GFR SERPL CREATININE-BSD FRML MDRD: 26 ML/MIN/1.73
GFR SERPLBLD CREATININE-BSD FMLA CKD-EPI: 25 ML/MIN/1.73
GFR SERPLBLD CREATININE-BSD FMLA CKD-EPI: 30 ML/MIN/1.73
GFR SERPLBLD CREATININE-BSD FMLA CKD-EPI: 30 ML/MIN/1.73
GFR SERPLBLD CREATININE-BSD FMLA CKD-EPI: 37 ML/MIN/1.73
GLOBULIN SER CALC-MCNC: 2.4 GM/DL
GLOBULIN SER CALC-MCNC: 2.6 GM/DL
GLOBULIN SER CALC-MCNC: 2.7 G/DL (ref 2.2–3.9)
GLOBULIN UR ELPH-MCNC: 2.5 GM/DL (ref 1.8–3.5)
GLUCOSE BLD-MCNC: 78 MG/DL (ref 65–99)
GLUCOSE BLD-MCNC: 88 MG/DL (ref 74–124)
GLUCOSE BLD-MCNC: 94 MG/DL (ref 74–124)
GLUCOSE BLDC GLUCOMTR-MCNC: 83 MG/DL (ref 70–130)
GLUCOSE SERPL-MCNC: 93 MG/DL (ref 65–99)
GLUCOSE SERPL-MCNC: 94 MG/DL (ref 65–99)
HCT VFR BLD AUTO: 29.2 % (ref 34–45)
HCT VFR BLD AUTO: 29.2 % (ref 34–45)
HCT VFR BLD AUTO: 30.2 % (ref 34–45)
HCT VFR BLD AUTO: 30.7 % (ref 35.6–45.5)
HGB BLD-MCNC: 9 G/DL (ref 11.5–14.9)
HGB BLD-MCNC: 9.1 G/DL (ref 11.5–14.9)
HGB BLD-MCNC: 9.4 G/DL (ref 11.5–14.9)
HGB BLD-MCNC: 9.5 G/DL (ref 11.9–15.5)
HGB RETIC QN: 33.8 PG (ref 29.8–36.1)
HIV 1 & 2 AB SER-IMP: ABNORMAL
IGA SERPL-MCNC: 20 MG/DL (ref 64–422)
IGG SERPL-MCNC: 1201 MG/DL (ref 700–1600)
IGM SERPL-MCNC: 19 MG/DL (ref 26–217)
IMM GRANULOCYTES # BLD AUTO: 0.02 10*3/MM3 (ref 0–0.03)
IMM GRANULOCYTES # BLD AUTO: 0.02 10*3/MM3 (ref 0–0.03)
IMM GRANULOCYTES # BLD: 0.02 10*3/MM3 (ref 0–0.03)
IMM GRANULOCYTES # BLD: 0.02 10*3/MM3 (ref 0–0.03)
IMM GRANULOCYTES NFR BLD AUTO: 0.4 % (ref 0–0.5)
IMM GRANULOCYTES NFR BLD AUTO: 0.4 % (ref 0–0.5)
IMM GRANULOCYTES NFR BLD: 0.4 % (ref 0–0.5)
IMM GRANULOCYTES NFR BLD: 0.4 % (ref 0–0.5)
IMM RETICS NFR: 11.1 % (ref 3–15.8)
INTERPRETATION UR IFE-IMP: ABNORMAL
IRON 24H UR-MRATE: 36 MCG/DL (ref 37–145)
IRON SATN MFR SERPL: 13 % (ref 14–48)
KAPPA LC SERPL-MCNC: 802.5 MG/L (ref 3.3–19.4)
KAPPA LC UR-MCNC: 715 MG/L (ref 1.35–24.19)
KAPPA LC/LAMBDA SER: 83.59 {RATIO} (ref 0.26–1.65)
KAPPA LC/LAMBDA UR: 111.54 {RATIO} (ref 2.04–10.37)
LAMBDA LC FREE SERPL-MCNC: 9.6 MG/L (ref 5.7–26.3)
LAMBDA LC UR-MCNC: 6.41 MG/L (ref 0.24–6.66)
LYMPHOCYTES # BLD AUTO: 0.5 10*3/MM3 (ref 1–3.5)
LYMPHOCYTES # BLD AUTO: 0.55 10*3/MM3 (ref 0.9–4.8)
LYMPHOCYTES # BLD AUTO: 0.78 10*3/MM3 (ref 1–3.5)
LYMPHOCYTES # BLD AUTO: 0.78 10*3/MM3 (ref 1–3.5)
LYMPHOCYTES NFR BLD AUTO: 10.5 % (ref 20–49)
LYMPHOCYTES NFR BLD AUTO: 11.5 % (ref 19.6–45.3)
LYMPHOCYTES NFR BLD AUTO: 14.7 % (ref 20–49)
LYMPHOCYTES NFR BLD AUTO: 16.4 % (ref 20–49)
Lab: ABNORMAL
M PROTEIN 24H MFR UR ELPH: 9.2 %
M PROTEIN 24H UR ELPH-MRATE: 62 MG/24 HR
M-SPIKE: 0.8 G/DL
MAGNESIUM SERPL-MCNC: 1.7 MG/DL (ref 1.8–2.5)
MCH RBC QN AUTO: 29.7 PG (ref 27–33)
MCH RBC QN AUTO: 29.8 PG (ref 26.9–32)
MCH RBC QN AUTO: 29.8 PG (ref 27–33)
MCH RBC QN AUTO: 30.1 PG (ref 27–33)
MCHC RBC AUTO-ENTMCNC: 30.8 G/DL (ref 32–35)
MCHC RBC AUTO-ENTMCNC: 30.9 G/DL (ref 32.4–36.3)
MCHC RBC AUTO-ENTMCNC: 31.1 G/DL (ref 32–35)
MCHC RBC AUTO-ENTMCNC: 31.2 G/DL (ref 32–35)
MCV RBC AUTO: 95.7 FL (ref 83–97)
MCV RBC AUTO: 96.2 FL (ref 80.5–98.2)
MCV RBC AUTO: 96.4 FL (ref 83–97)
MCV RBC AUTO: 96.8 FL (ref 83–97)
MONOCYTES # BLD AUTO: 0.21 10*3/MM3 (ref 0.2–1.2)
MONOCYTES # BLD AUTO: 0.24 10*3/MM3 (ref 0.25–0.8)
MONOCYTES # BLD AUTO: 0.27 10*3/MM3 (ref 0.25–0.8)
MONOCYTES # BLD AUTO: 0.36 10*3/MM3 (ref 0.25–0.8)
MONOCYTES NFR BLD AUTO: 4.4 % (ref 5–12)
MONOCYTES NFR BLD AUTO: 5 % (ref 4–12)
MONOCYTES NFR BLD AUTO: 5.7 % (ref 4–12)
MONOCYTES NFR BLD AUTO: 6.8 % (ref 4–12)
NEUTROPHILS # BLD AUTO: 3.64 10*3/MM3 (ref 1.5–7)
NEUTROPHILS # BLD AUTO: 3.91 10*3/MM3 (ref 1.5–7)
NEUTROPHILS # BLD AUTO: 3.96 10*3/MM3 (ref 1.9–8.1)
NEUTROPHILS # BLD AUTO: 4 10*3/MM3 (ref 1.5–7)
NEUTROPHILS NFR BLD AUTO: 75.6 % (ref 39–75)
NEUTROPHILS NFR BLD AUTO: 76.5 % (ref 39–75)
NEUTROPHILS NFR BLD AUTO: 82.4 % (ref 39–75)
NEUTROPHILS NFR BLD AUTO: 82.9 % (ref 42.7–76)
NRBC BLD AUTO-RTO: 0 /100 WBC (ref 0–0)
NRBC BLD AUTO-RTO: 0 /100 WBC (ref 0–0)
NRBC BLD MANUAL-RTO: 0 /100 WBC (ref 0–0)
NRBC BLD MANUAL-RTO: 0 /100 WBC (ref 0–0)
PHOSPHATE SERPL-MCNC: 4.8 MG/DL (ref 2.5–4.5)
PLATELET # BLD AUTO: 159 10*3/MM3 (ref 140–500)
PLATELET # BLD AUTO: 168 10*3/MM3 (ref 150–375)
PLATELET # BLD AUTO: 169 10*3/MM3 (ref 150–375)
PLATELET # BLD AUTO: 178 10*3/MM3 (ref 150–375)
PMV BLD AUTO: 10.4 FL (ref 6–12)
PMV BLD AUTO: 10.4 FL (ref 8.9–12.1)
PMV BLD AUTO: 10.4 FL (ref 8.9–12.1)
PMV BLD AUTO: 9.8 FL (ref 8.9–12.1)
POTASSIUM BLD-SCNC: 4 MMOL/L (ref 3.5–4.7)
POTASSIUM BLD-SCNC: 4 MMOL/L (ref 3.5–4.7)
POTASSIUM BLD-SCNC: 4.9 MMOL/L (ref 3.5–5.2)
POTASSIUM SERPL-SCNC: 4.3 MMOL/L (ref 3.5–5.2)
POTASSIUM SERPL-SCNC: 4.9 MMOL/L (ref 3.5–5.2)
PROT 24H UR-MRATE: 669 MG/24 HR (ref 30–150)
PROT PATTERN SERPL IFE-IMP: ABNORMAL
PROT SERPL-MCNC: 6.3 G/DL (ref 6.3–8)
PROT SERPL-MCNC: 6.5 G/DL (ref 6–8.5)
PROT UR-MCNC: 95.6 MG/DL
PTH-INTACT SERPL-MCNC: 32 PG/ML (ref 15–65)
RBC # BLD AUTO: 3.03 10*6/MM3 (ref 3.9–5)
RBC # BLD AUTO: 3.05 10*6/MM3 (ref 3.9–5)
RBC # BLD AUTO: 3.12 10*6/MM3 (ref 3.9–5)
RBC # BLD AUTO: 3.19 10*6/MM3 (ref 3.9–5.2)
RETICS/RBC NFR AUTO: 1.18 % (ref 0.6–2)
SODIUM BLD-SCNC: 144 MMOL/L (ref 134–145)
SODIUM BLD-SCNC: 144 MMOL/L (ref 136–145)
SODIUM BLD-SCNC: 145 MMOL/L (ref 134–145)
SODIUM SERPL-SCNC: 143 MMOL/L (ref 136–145)
SODIUM SERPL-SCNC: 146 MMOL/L (ref 136–145)
T4 FREE SERPL-MCNC: 1.7 NG/DL (ref 0.93–1.7)
TIBC SERPL-MCNC: 279 MCG/DL (ref 249–505)
TRANSFERRIN SERPL-MCNC: 199 MG/DL (ref 200–360)
TSH SERPL DL<=0.005 MIU/L-ACNC: 0.73 MIU/ML (ref 0.27–4.2)
TSH SERPL DL<=0.005 MIU/L-ACNC: 9.45 MIU/ML (ref 0.27–4.2)
WBC NRBC COR # BLD: 4.75 10*3/MM3 (ref 4–10)
WBC NRBC COR # BLD: 4.76 10*3/MM3 (ref 4–10)
WBC NRBC COR # BLD: 4.78 10*3/MM3 (ref 4.5–10.7)
WBC NRBC COR # BLD: 5.29 10*3/MM3 (ref 4–10)

## 2018-01-01 PROCEDURE — 83883 ASSAY NEPHELOMETRY NOT SPEC: CPT | Performed by: INTERNAL MEDICINE

## 2018-01-01 PROCEDURE — 99215 OFFICE O/P EST HI 40 MIN: CPT | Performed by: INTERNAL MEDICINE

## 2018-01-01 PROCEDURE — A9552 F18 FDG: HCPCS | Performed by: INTERNAL MEDICINE

## 2018-01-01 PROCEDURE — 96360 HYDRATION IV INFUSION INIT: CPT | Performed by: NURSE PRACTITIONER

## 2018-01-01 PROCEDURE — 96372 THER/PROPH/DIAG INJ SC/IM: CPT

## 2018-01-01 PROCEDURE — 73502 X-RAY EXAM HIP UNI 2-3 VIEWS: CPT

## 2018-01-01 PROCEDURE — 85025 COMPLETE CBC W/AUTO DIFF WBC: CPT | Performed by: INTERNAL MEDICINE

## 2018-01-01 PROCEDURE — 80048 BASIC METABOLIC PNL TOTAL CA: CPT

## 2018-01-01 PROCEDURE — 99213 OFFICE O/P EST LOW 20 MIN: CPT | Performed by: INTERNAL MEDICINE

## 2018-01-01 PROCEDURE — 25010000002 DENOSUMAB 120 MG/1.7ML SOLUTION: Performed by: INTERNAL MEDICINE

## 2018-01-01 PROCEDURE — 84100 ASSAY OF PHOSPHORUS: CPT | Performed by: INTERNAL MEDICINE

## 2018-01-01 PROCEDURE — 96372 THER/PROPH/DIAG INJ SC/IM: CPT | Performed by: INTERNAL MEDICINE

## 2018-01-01 PROCEDURE — 96401 CHEMO ANTI-NEOPL SQ/IM: CPT | Performed by: NURSE PRACTITIONER

## 2018-01-01 PROCEDURE — 80048 BASIC METABOLIC PNL TOTAL CA: CPT | Performed by: INTERNAL MEDICINE

## 2018-01-01 PROCEDURE — 25010000002 BORTEZOMIB PER 0.1 MG: Performed by: INTERNAL MEDICINE

## 2018-01-01 PROCEDURE — 85046 RETICYTE/HGB CONCENTRATE: CPT

## 2018-01-01 PROCEDURE — 96361 HYDRATE IV INFUSION ADD-ON: CPT | Performed by: INTERNAL MEDICINE

## 2018-01-01 PROCEDURE — 90715 TDAP VACCINE 7 YRS/> IM: CPT | Performed by: EMERGENCY MEDICINE

## 2018-01-01 PROCEDURE — 36415 COLL VENOUS BLD VENIPUNCTURE: CPT | Performed by: INTERNAL MEDICINE

## 2018-01-01 PROCEDURE — 25010000002 TDAP 5-2.5-18.5 LF-MCG/0.5 SUSPENSION: Performed by: EMERGENCY MEDICINE

## 2018-01-01 PROCEDURE — 82962 GLUCOSE BLOOD TEST: CPT

## 2018-01-01 PROCEDURE — 84466 ASSAY OF TRANSFERRIN: CPT

## 2018-01-01 PROCEDURE — 73560 X-RAY EXAM OF KNEE 1 OR 2: CPT

## 2018-01-01 PROCEDURE — 82728 ASSAY OF FERRITIN: CPT

## 2018-01-01 PROCEDURE — 99285 EMERGENCY DEPT VISIT HI MDM: CPT

## 2018-01-01 PROCEDURE — 99213 OFFICE O/P EST LOW 20 MIN: CPT | Performed by: NURSE PRACTITIONER

## 2018-01-01 PROCEDURE — 70480 CT ORBIT/EAR/FOSSA W/O DYE: CPT

## 2018-01-01 PROCEDURE — 99212-NC PR NO CHARGE CBC OFFICE OUTPATIENT VISIT 10 MINUTES: Performed by: NURSE PRACTITIONER

## 2018-01-01 PROCEDURE — 36415 COLL VENOUS BLD VENIPUNCTURE: CPT

## 2018-01-01 PROCEDURE — 0 FLUDEOXYGLUCOSE F18 SOLUTION: Performed by: INTERNAL MEDICINE

## 2018-01-01 PROCEDURE — 96401 CHEMO ANTI-NEOPL SQ/IM: CPT | Performed by: INTERNAL MEDICINE

## 2018-01-01 PROCEDURE — 90471 IMMUNIZATION ADMIN: CPT | Performed by: EMERGENCY MEDICINE

## 2018-01-01 PROCEDURE — 83735 ASSAY OF MAGNESIUM: CPT | Performed by: INTERNAL MEDICINE

## 2018-01-01 PROCEDURE — 25010000002 DENOSUMAB 60 MG/ML SOLUTION: Performed by: INTERNAL MEDICINE

## 2018-01-01 PROCEDURE — 72125 CT NECK SPINE W/O DYE: CPT

## 2018-01-01 PROCEDURE — 73090 X-RAY EXAM OF FOREARM: CPT

## 2018-01-01 PROCEDURE — 80053 COMPREHEN METABOLIC PANEL: CPT | Performed by: INTERNAL MEDICINE

## 2018-01-01 PROCEDURE — 70450 CT HEAD/BRAIN W/O DYE: CPT

## 2018-01-01 PROCEDURE — 83540 ASSAY OF IRON: CPT

## 2018-01-01 PROCEDURE — 96361 HYDRATE IV INFUSION ADD-ON: CPT | Performed by: NURSE PRACTITIONER

## 2018-01-01 PROCEDURE — 78816 PET IMAGE W/CT FULL BODY: CPT

## 2018-01-01 RX ORDER — LIDOCAINE HYDROCHLORIDE 10 MG/ML
INJECTION, SOLUTION EPIDURAL; INFILTRATION; INTRACAUDAL; PERINEURAL
Status: DISCONTINUED
Start: 2018-01-01 | End: 2018-01-01 | Stop reason: WASHOUT

## 2018-01-01 RX ORDER — HYDROCODONE BITARTRATE AND ACETAMINOPHEN 7.5; 325 MG/1; MG/1
1 TABLET ORAL EVERY 4 HOURS PRN
Qty: 24 TABLET | Refills: 0 | Status: SHIPPED | OUTPATIENT
Start: 2018-01-01

## 2018-01-01 RX ORDER — SODIUM CHLORIDE 9 MG/ML
1000 INJECTION, SOLUTION INTRAVENOUS ONCE
Status: COMPLETED | OUTPATIENT
Start: 2018-01-01 | End: 2018-01-01

## 2018-01-01 RX ORDER — LEVOTHYROXINE SODIUM 0.07 MG/1
75 TABLET ORAL DAILY
Qty: 90 TABLET | Refills: 1 | Status: SHIPPED | OUTPATIENT
Start: 2018-01-01 | End: 2018-01-01 | Stop reason: SDUPTHER

## 2018-01-01 RX ORDER — LEVOTHYROXINE SODIUM 0.07 MG/1
75 TABLET ORAL DAILY
Qty: 90 TABLET | Refills: 1
Start: 2018-01-01 | End: 2019-01-01 | Stop reason: SDUPTHER

## 2018-01-01 RX ORDER — DEXAMETHASONE 4 MG/1
20 TABLET ORAL
Qty: 15 TABLET | Refills: 3 | Status: SHIPPED | OUTPATIENT
Start: 2018-01-01 | End: 2019-01-01 | Stop reason: SDUPTHER

## 2018-01-01 RX ORDER — LIDOCAINE HYDROCHLORIDE 10 MG/ML
10 INJECTION, SOLUTION EPIDURAL; INFILTRATION; INTRACAUDAL; PERINEURAL ONCE
Status: DISCONTINUED | OUTPATIENT
Start: 2018-01-01 | End: 2018-01-01 | Stop reason: HOSPADM

## 2018-01-01 RX ORDER — AMLODIPINE BESYLATE 5 MG/1
TABLET ORAL
Qty: 90 TABLET | Refills: 0 | Status: SHIPPED | OUTPATIENT
Start: 2018-01-01

## 2018-01-01 RX ORDER — BUMETANIDE 0.5 MG/1
TABLET ORAL
Qty: 45 TABLET | Refills: 0 | Status: SHIPPED | OUTPATIENT
Start: 2018-01-01 | End: 2018-01-01 | Stop reason: SDUPTHER

## 2018-01-01 RX ORDER — LEVOTHYROXINE SODIUM 0.07 MG/1
TABLET ORAL
Qty: 90 TABLET | Refills: 0 | Status: SHIPPED | OUTPATIENT
Start: 2018-01-01 | End: 2019-01-01 | Stop reason: SDUPTHER

## 2018-01-01 RX ORDER — SODIUM CHLORIDE 9 MG/ML
500 INJECTION, SOLUTION INTRAVENOUS ONCE
Status: COMPLETED | OUTPATIENT
Start: 2018-01-01 | End: 2018-01-01

## 2018-01-01 RX ORDER — HYDROCODONE BITARTRATE AND ACETAMINOPHEN 7.5; 325 MG/1; MG/1
1 TABLET ORAL ONCE
Status: COMPLETED | OUTPATIENT
Start: 2018-01-01 | End: 2018-01-01

## 2018-01-01 RX ORDER — METHYLPREDNISOLONE 4 MG/1
TABLET ORAL
Qty: 21 EACH | Refills: 0 | Status: SHIPPED | OUTPATIENT
Start: 2018-01-01 | End: 2019-01-01

## 2018-01-01 RX ORDER — BUMETANIDE 1 MG/1
TABLET ORAL
Qty: 90 TABLET | Refills: 0 | Status: SHIPPED | OUTPATIENT
Start: 2018-01-01 | End: 2019-01-01 | Stop reason: DRUGHIGH

## 2018-01-01 RX ORDER — BORTEZOMIB 3.5 MG/1
1.3 INJECTION, POWDER, LYOPHILIZED, FOR SOLUTION INTRAVENOUS; SUBCUTANEOUS ONCE
Status: CANCELLED | OUTPATIENT
Start: 2019-01-01

## 2018-01-01 RX ORDER — HYDROCODONE BITARTRATE AND ACETAMINOPHEN 7.5; 325 MG/1; MG/1
1 TABLET ORAL EVERY 4 HOURS PRN
Qty: 20 TABLET | Refills: 0 | Status: SHIPPED | OUTPATIENT
Start: 2018-01-01 | End: 2018-01-01 | Stop reason: SDUPTHER

## 2018-01-01 RX ORDER — BORTEZOMIB 3.5 MG/1
1.3 INJECTION, POWDER, LYOPHILIZED, FOR SOLUTION INTRAVENOUS; SUBCUTANEOUS ONCE
Status: CANCELLED | OUTPATIENT
Start: 2018-01-01

## 2018-01-01 RX ORDER — BORTEZOMIB 3.5 MG/1
1.3 INJECTION, POWDER, LYOPHILIZED, FOR SOLUTION INTRAVENOUS; SUBCUTANEOUS ONCE
Status: COMPLETED | OUTPATIENT
Start: 2018-01-01 | End: 2018-01-01

## 2018-01-01 RX ORDER — LIDOCAINE HYDROCHLORIDE 10 MG/ML
10 INJECTION, SOLUTION INFILTRATION; PERINEURAL ONCE
Status: DISCONTINUED | OUTPATIENT
Start: 2018-01-01 | End: 2018-01-01

## 2018-01-01 RX ORDER — AMLODIPINE BESYLATE 10 MG/1
5 TABLET ORAL DAILY
Qty: 90 TABLET | Refills: 0 | Status: SHIPPED | OUTPATIENT
Start: 2018-01-01 | End: 2018-01-01 | Stop reason: SDUPTHER

## 2018-01-01 RX ORDER — ACYCLOVIR 400 MG/1
200 TABLET ORAL 2 TIMES DAILY
Qty: 60 TABLET | Refills: 7 | Status: SHIPPED | OUTPATIENT
Start: 2018-01-01

## 2018-01-01 RX ORDER — ONDANSETRON HYDROCHLORIDE 8 MG/1
8 TABLET, FILM COATED ORAL 3 TIMES DAILY PRN
Qty: 30 TABLET | Refills: 5 | Status: SHIPPED | OUTPATIENT
Start: 2018-01-01

## 2018-01-01 RX ORDER — PREDNISONE 10 MG/1
TABLET ORAL
Qty: 28 TABLET | Refills: 0 | Status: SHIPPED | OUTPATIENT
Start: 2018-01-01 | End: 2019-01-01

## 2018-01-01 RX ORDER — CEPHALEXIN 500 MG/1
500 CAPSULE ORAL 2 TIMES DAILY
Qty: 14 CAPSULE | Refills: 0 | Status: SHIPPED | OUTPATIENT
Start: 2018-01-01 | End: 2019-01-01

## 2018-01-01 RX ORDER — BUMETANIDE 0.5 MG/1
1 TABLET ORAL DAILY
Qty: 90 TABLET | Refills: 0 | Status: SHIPPED | OUTPATIENT
Start: 2018-01-01 | End: 2018-01-01 | Stop reason: SDUPTHER

## 2018-01-01 RX ORDER — BUMETANIDE 0.5 MG/1
TABLET ORAL
Qty: 90 TABLET | Refills: 0 | Status: SHIPPED | OUTPATIENT
Start: 2018-01-01 | End: 2018-01-01 | Stop reason: ALTCHOICE

## 2018-01-01 RX ORDER — BUMETANIDE 1 MG/1
1 TABLET ORAL DAILY
Qty: 90 TABLET | Refills: 1 | Status: SHIPPED | OUTPATIENT
Start: 2018-01-01 | End: 2018-01-01 | Stop reason: SDUPTHER

## 2018-01-01 RX ORDER — AMLODIPINE BESYLATE 5 MG/1
5 TABLET ORAL DAILY
Qty: 90 TABLET | Refills: 1 | Status: SHIPPED | OUTPATIENT
Start: 2018-01-01 | End: 2018-01-01 | Stop reason: SDUPTHER

## 2018-01-01 RX ADMIN — FLUDEOXYGLUCOSE F18 1 DOSE: 300 INJECTION INTRAVENOUS at 08:52

## 2018-01-01 RX ADMIN — SODIUM CHLORIDE 500 ML/HR: 900 INJECTION, SOLUTION INTRAVENOUS at 10:14

## 2018-01-01 RX ADMIN — SODIUM CHLORIDE 1000 ML: 900 INJECTION, SOLUTION INTRAVENOUS at 14:41

## 2018-01-01 RX ADMIN — BORTEZOMIB 1.8 MG: 3.5 INJECTION, POWDER, LYOPHILIZED, FOR SOLUTION INTRAVENOUS; SUBCUTANEOUS at 16:23

## 2018-01-01 RX ADMIN — DENOSUMAB 120 MG: 120 INJECTION SUBCUTANEOUS at 15:11

## 2018-01-01 RX ADMIN — DENOSUMAB 60 MG: 60 INJECTION SUBCUTANEOUS at 10:48

## 2018-01-01 RX ADMIN — HYDROCODONE BITARTRATE AND ACETAMINOPHEN 1 TABLET: 7.5; 325 TABLET ORAL at 15:41

## 2018-01-01 RX ADMIN — DENOSUMAB 120 MG: 120 INJECTION SUBCUTANEOUS at 14:08

## 2018-01-01 RX ADMIN — BORTEZOMIB 1.8 MG: 3.5 INJECTION, POWDER, LYOPHILIZED, FOR SOLUTION INTRAVENOUS; SUBCUTANEOUS at 11:48

## 2018-01-01 RX ADMIN — TETANUS TOXOID, REDUCED DIPHTHERIA TOXOID AND ACELLULAR PERTUSSIS VACCINE, ADSORBED 0.5 ML: 5; 2.5; 8; 8; 2.5 SUSPENSION INTRAMUSCULAR at 17:37

## 2018-01-02 ENCOUNTER — LAB (OUTPATIENT)
Dept: LAB | Facility: HOSPITAL | Age: 83
End: 2018-01-02

## 2018-01-02 DIAGNOSIS — D50.8 OTHER IRON DEFICIENCY ANEMIA: ICD-10-CM

## 2018-01-02 DIAGNOSIS — N18.9 CHRONIC KIDNEY DISEASE, UNSPECIFIED CKD STAGE: Primary | ICD-10-CM

## 2018-01-02 DIAGNOSIS — D50.9 IRON DEFICIENCY ANEMIA, UNSPECIFIED IRON DEFICIENCY ANEMIA TYPE: ICD-10-CM

## 2018-01-02 DIAGNOSIS — D47.2 SMOLDERING MULTIPLE MYELOMA (SMM): ICD-10-CM

## 2018-01-02 LAB
ANION GAP SERPL CALCULATED.3IONS-SCNC: 10.8 MMOL/L
BASOPHILS # BLD AUTO: 0.02 10*3/MM3 (ref 0–0.1)
BASOPHILS NFR BLD AUTO: 0.4 % (ref 0–1.1)
BUN BLD-MCNC: 43 MG/DL (ref 6–20)
BUN/CREAT SERPL: 27.9 (ref 7.3–30)
CALCIUM SPEC-SCNC: 10 MG/DL (ref 8.5–10.2)
CHLORIDE SERPL-SCNC: 108 MMOL/L (ref 98–107)
CO2 SERPL-SCNC: 23.2 MMOL/L (ref 22–29)
CREAT BLD-MCNC: 1.54 MG/DL (ref 0.6–1.1)
DEPRECATED RDW RBC AUTO: 50.2 FL (ref 37–49)
EOSINOPHIL # BLD AUTO: 0.1 10*3/MM3 (ref 0–0.36)
EOSINOPHIL NFR BLD AUTO: 2 % (ref 1–5)
ERYTHROCYTE [DISTWIDTH] IN BLOOD BY AUTOMATED COUNT: 13.4 % (ref 11.7–14.5)
FERRITIN SERPL-MCNC: 159 NG/ML
GFR SERPL CREATININE-BSD FRML MDRD: 32 ML/MIN/1.73
GLUCOSE BLD-MCNC: 97 MG/DL (ref 74–124)
HCT VFR BLD AUTO: 31.5 % (ref 34–45)
HGB BLD-MCNC: 10.1 G/DL (ref 11.5–14.9)
HGB RETIC QN: 34.7 PG (ref 29.8–36.1)
IMM GRANULOCYTES # BLD: 0.01 10*3/MM3 (ref 0–0.03)
IMM GRANULOCYTES NFR BLD: 0.2 % (ref 0–0.5)
IMM RETICS NFR: 7.5 % (ref 3–15.8)
IRON 24H UR-MRATE: 51 MCG/DL (ref 37–145)
IRON SATN MFR SERPL: 21 % (ref 14–48)
LYMPHOCYTES # BLD AUTO: 0.65 10*3/MM3 (ref 1–3.5)
LYMPHOCYTES NFR BLD AUTO: 13.2 % (ref 20–49)
MCH RBC QN AUTO: 32.3 PG (ref 27–33)
MCHC RBC AUTO-ENTMCNC: 32.1 G/DL (ref 32–35)
MCV RBC AUTO: 100.6 FL (ref 83–97)
MONOCYTES # BLD AUTO: 0.36 10*3/MM3 (ref 0.25–0.8)
MONOCYTES NFR BLD AUTO: 7.3 % (ref 4–12)
NEUTROPHILS # BLD AUTO: 3.77 10*3/MM3 (ref 1.5–7)
NEUTROPHILS NFR BLD AUTO: 76.9 % (ref 39–75)
NRBC BLD MANUAL-RTO: 0 /100 WBC (ref 0–0)
PLATELET # BLD AUTO: 173 10*3/MM3 (ref 150–375)
PMV BLD AUTO: 10.1 FL (ref 8.9–12.1)
POTASSIUM BLD-SCNC: 4.7 MMOL/L (ref 3.5–4.7)
RBC # BLD AUTO: 3.13 10*6/MM3 (ref 3.9–5)
RETICS/RBC NFR AUTO: 1.13 % (ref 0.6–2)
SODIUM BLD-SCNC: 142 MMOL/L (ref 134–145)
TIBC SERPL-MCNC: 239 MCG/DL (ref 249–505)
TRANSFERRIN SERPL-MCNC: 171 MG/DL (ref 200–360)
WBC NRBC COR # BLD: 4.91 10*3/MM3 (ref 4–10)

## 2018-01-02 PROCEDURE — 83540 ASSAY OF IRON: CPT

## 2018-01-02 PROCEDURE — 85046 RETICYTE/HGB CONCENTRATE: CPT

## 2018-01-02 PROCEDURE — 85025 COMPLETE CBC W/AUTO DIFF WBC: CPT

## 2018-01-02 PROCEDURE — 36415 COLL VENOUS BLD VENIPUNCTURE: CPT | Performed by: INTERNAL MEDICINE

## 2018-01-02 PROCEDURE — 80048 BASIC METABOLIC PNL TOTAL CA: CPT

## 2018-01-02 PROCEDURE — 84466 ASSAY OF TRANSFERRIN: CPT

## 2018-01-02 PROCEDURE — 82728 ASSAY OF FERRITIN: CPT

## 2018-01-02 RX ORDER — AMLODIPINE BESYLATE 10 MG/1
TABLET ORAL
Qty: 90 TABLET | Refills: 0 | Status: SHIPPED | OUTPATIENT
Start: 2018-01-02 | End: 2018-01-01 | Stop reason: SDUPTHER

## 2018-01-03 LAB
ALBUMIN SERPL-MCNC: 3.6 G/DL (ref 2.9–4.4)
ALBUMIN/GLOB SERPL: 1.3 {RATIO} (ref 0.7–1.7)
ALPHA1 GLOB FLD ELPH-MCNC: 0.3 G/DL (ref 0–0.4)
ALPHA2 GLOB SERPL ELPH-MCNC: 0.6 G/DL (ref 0.4–1)
B-GLOBULIN SERPL ELPH-MCNC: 0.7 G/DL (ref 0.7–1.3)
GAMMA GLOB SERPL ELPH-MCNC: 1.1 G/DL (ref 0.4–1.8)
GLOBULIN SER CALC-MCNC: 2.8 G/DL (ref 2.2–3.9)
IGA SERPL-MCNC: 23 MG/DL (ref 64–422)
IGG SERPL-MCNC: 1129 MG/DL (ref 700–1600)
IGM SERPL-MCNC: 22 MG/DL (ref 26–217)
INTERPRETATION SERPL IEP-IMP: ABNORMAL
KAPPA LC SERPL-MCNC: 421.6 MG/L (ref 3.3–19.4)
KAPPA LC/LAMBDA SER: 47.37 {RATIO} (ref 0.26–1.65)
LAMBDA LC FREE SERPL-MCNC: 8.9 MG/L (ref 5.7–26.3)
Lab: ABNORMAL
M-SPIKE: 0.9 G/DL
PROT SERPL-MCNC: 6.4 G/DL (ref 6–8.5)

## 2018-01-16 ENCOUNTER — APPOINTMENT (OUTPATIENT)
Dept: LAB | Facility: HOSPITAL | Age: 83
End: 2018-01-16

## 2018-01-16 ENCOUNTER — APPOINTMENT (OUTPATIENT)
Dept: ONCOLOGY | Facility: CLINIC | Age: 83
End: 2018-01-16

## 2018-01-23 ENCOUNTER — OFFICE VISIT (OUTPATIENT)
Dept: ONCOLOGY | Facility: CLINIC | Age: 83
End: 2018-01-23

## 2018-01-23 ENCOUNTER — APPOINTMENT (OUTPATIENT)
Dept: LAB | Facility: HOSPITAL | Age: 83
End: 2018-01-23

## 2018-01-23 VITALS
DIASTOLIC BLOOD PRESSURE: 70 MMHG | TEMPERATURE: 97.8 F | BODY MASS INDEX: 20.12 KG/M2 | WEIGHT: 106.6 LBS | HEIGHT: 61 IN | HEART RATE: 86 BPM | SYSTOLIC BLOOD PRESSURE: 130 MMHG | RESPIRATION RATE: 16 BRPM

## 2018-01-23 DIAGNOSIS — D47.2 SMOLDERING MULTIPLE MYELOMA (SMM): Primary | ICD-10-CM

## 2018-01-23 DIAGNOSIS — D50.9 IRON DEFICIENCY ANEMIA, UNSPECIFIED IRON DEFICIENCY ANEMIA TYPE: ICD-10-CM

## 2018-01-23 PROCEDURE — G0463 HOSPITAL OUTPT CLINIC VISIT: HCPCS | Performed by: INTERNAL MEDICINE

## 2018-01-23 PROCEDURE — 99214 OFFICE O/P EST MOD 30 MIN: CPT | Performed by: INTERNAL MEDICINE

## 2018-01-23 NOTE — PROGRESS NOTES
Subjective .     REASONS FOR FOLLOWUP:  Smoldering multiple myeloma, anemia    HISTORY OF PRESENT ILLNESS:  The patient is a 88 y.o. year old female  who is here for follow-up with the above-mentioned history.    Denies fever or chills.  Denies unintentional significant weight loss  Denies drenching night sweats.  Denies chest pain.  Denies shortness of air.  Denies dizziness.  Denies weakness.  Denies bleeding from any location.    States her left leg has been swollen times 6-9 months.  States she caught her PCP office and Bumex was started but has not improved the swelling.    Past Medical History:   Diagnosis Date   • Anemia     Secondary to chronic renal insufficiency and folate insufficiency   • Cataract    • Cystic mass of pancreatic head     On CT of 01/16/2009. stable in 02/2011   • DVT (deep venous thrombosis) 08/2010    PICC-associated DVT resulting in removal of PICC   • Folate deficiency    • Frequent UTI    • Hypertension    • Hypogammaglobulinemia    • Left hip pain    • Myeloma     Smoldering myeloma versus multiple myeloma   • Osteoporosis    • PICC (peripherally inserted central catheter) removal     Secondary to DVT   • Renal insufficiency     Chronic   • Small bowel perforation 07/2010    Small bowel and cecal perforation with abcess formation and infected mesh from prior hernia repair   • Temporal arteritis 11/10/2005   • Torn rotator cuff 2007     Past Surgical History:   Procedure Laterality Date   • BLADDER REPAIR  1995   • COLOSTOMY  01/2009    Patient had a ruptured sigmoid diverticulum which led to a diverting colostomy   • EYE SURGERY  04/2007    Cataract   • HERNIA REPAIR  04/26/2010    Ventral hernia repair from prior sigmoid resection   • HYSTERECTOMY  1995   • JOINT REPLACEMENT  2002    Bilateral knee replacements       HEMATOLOGIC/ONCOLOGIC HISTORY:  (History from previous dates can be found in the separate document.)    MEDICATIONS    Current Outpatient Prescriptions:   •   acetaminophen (TYLENOL) 500 MG tablet, Take 1 tablet by mouth Every 6 (Six) Hours As Needed for mild pain (1-3)., Disp: 30 tablet, Rfl: 0  •  amLODIPine (NORVASC) 10 MG tablet, TAKE 1 TABLET BY MOUTH ONE TIME A DAY , Disp: 90 tablet, Rfl: 0  •  B Complex Vitamins (VITAMIN B COMPLEX) capsule capsule, Take 1 capsule by mouth Daily., Disp: , Rfl:   •  bumetanide (BUMEX) 0.5 MG tablet, TAKE 1/2 TABLET BY MOUTH ONE TIME A DAY , Disp: 45 tablet, Rfl: 0  •  calcium-vitamin D (OSCAL-500) 500-200 MG-UNIT per tablet, Take 1 tablet by mouth., Disp: , Rfl:   •  cholecalciferol (VITAMIN D3) 1000 UNITS tablet, Take 1,000 Units by mouth Daily., Disp: , Rfl:   •  denosumab (PROLIA) 60 MG/ML solution syringe, Inject  under the skin., Disp: , Rfl:   •  Multiple Vitamins-Calcium (ONE-A-DAY WOMENS PO), Take  by mouth., Disp: , Rfl:     ALLERGIES:     Allergies   Allergen Reactions   • Sulfa Antibiotics        SOCIAL HISTORY:       Social History     Social History   • Marital status:      Spouse name: N/A   • Number of children: N/A   • Years of education: N/A     Occupational History   •  [employer]     Social History Main Topics   • Smoking status: Former Smoker   • Smokeless tobacco: Not on file   • Alcohol use No   • Drug use: Not on file   • Sexual activity: Not on file     Other Topics Concern   • Not on file     Social History Narrative    Spouse  from what sounds like cholangiocarcinoma.         FAMILY HISTORY:  Family History   Problem Relation Age of Onset   • Cancer Neg Hx        REVIEW OF SYSTEMS:  GENERAL: No change in appetite or weight;   No fevers, chills, sweats.    SKIN: No nonhealing lesions.   No rashes.  HEME/LYMPH: No easy bruising, bleeding.   No swollen nodes.   EYES: No vision changes or diplopia.   ENT: No tinnitus, hearing loss, gum bleeding, epistaxis, hoarseness or dysphagia.   RESPIRATORY: No cough, shortness of breath, hemoptysis or wheezing.   CVS: No chest pain, palpitations,  "orthopnea, dyspnea on exertion or PND.   GI: No melena or hematochezia.   No abdominal pain.  No nausea, vomiting, constipation, diarrhea  : No lower tract obstructive symptoms, dysuria or hematuria.   MUSCULOSKELETAL: Chronic shoulder pain bilaterally.  NEUROLOGICAL: No global weakness, loss of consciousness or seizures.   PSYCHIATRIC: No increased nervousness, mood changes or depression.       Objective    Vitals:    01/23/18 1551   BP: 130/70   Pulse: 86   Resp: 16   Temp: 97.8 °F (36.6 °C)   Weight: 48.4 kg (106 lb 9.6 oz)   Height: 156 cm (61.42\")  Comment: new ht.   PainSc: 0-No pain     Current Status 1/23/2018   ECOG score 0      PHYSICAL EXAM:    GENERAL:  Well-developed, well-nourished in no acute distress.   SKIN:  Warm, dry without rashes, purpura or petechiae.  HEAD:  Normocephalic.  EYES:  Pupils equal, round and reactive to light.  EOMs intact.  Conjunctivae normal.  EARS:  Hearing intact.  NOSE:  Septum midline.  No excoriations or nasal discharge.  MOUTH:  Tongue is well-papillated; no stomatitis or ulcers.  Lips normal.  THROAT:  Oropharynx without lesions or exudates.  NECK:  Supple with good range of motion; no thyromegaly or masses, no JVD.  LYMPHATICS:  No cervical, supraclavicular, axillary or inguinal adenopathy.  CHEST:  Lungs clear to percussion and auscultation. Good airflow.  CARDIAC:  Regular rate and rhythm without murmurs, rubs or gallops. Normal S1,S2.  ABDOMEN:  Soft, nontender with no organomegaly or masses.  EXTREMITIES:  No clubbing, cyanosis or edema.  NEUROLOGICAL:  Cranial Nerves II-XII grossly intact.  No focal neurological deficits.  PSYCHIATRIC:  Normal affect and mood.      RECENT LABS:        WBC   Date/Time Value Ref Range Status   01/02/2018 10:55 AM 4.91 4.00 - 10.00 10*3/mm3 Final     Hemoglobin   Date/Time Value Ref Range Status   01/02/2018 10:55 AM 10.1 (L) 11.5 - 14.9 g/dL Final     Platelets   Date/Time Value Ref Range Status   01/02/2018 10:55  150 - 375 " 10*3/mm3 Final       Assessment/Plan     ASSESSMENT:  *Smoldering myeloma.  Bone marrow 3/31/11 revealed 10% monoclonal IgG kappa plasma cells.  Main issue is anemia.  She responds to Procrit (that has not needed this for quite some time).   M spike stable at 0.9.    *Iron deficiency anemia.  (In the past, difficulty tolerating by mouth iron).  However, has been on iron 3 times per week since the fall 2012.  Hemoglobin is stable.  History of Feraheme  Recent iron Normal.    *Anemia of chronic renal insufficiency, stage III.  She likes to minimize Procrit.  Has not received Procrit for quite some time.  Sees Dr. Federico Cagle.    *PICC associated DVT August 2010.  Completed 6 months Coumadin 3/20/11.  (If treatment for myeloma is given, will need to keep this in mind, since she had a clot in the past).    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  Completed 2 years of CAT scan follow-up 02/02/11.  Plan no more routine CAT scans.    *Left leg swelling times 6-9 months.  I recommended she have a left leg Doppler today and if positive receive Lovenox daily through our office while awaiting a therapeutic INR on Coumadin.  I told her an untreated DVT could be life-threatening due to subsequent pulmonary embolism.  She understands this but absolutely refuses workup or treatment of this.  She does not like coming to doctor's appointments.  She doesn't want any more visits or trips in.    PLAN:  · M.D. 6 months.  2 weeks prior:  · Serum protein studies, iron labs, CBC, BMP  · She declines any more urine studies  · She wants M.D. follow-up no more often than every 6 months  · In the past, she has preferred bone surveys every 2 years.  Today she states she may decide on no more bone surveys.  · Last bone survey 1/23/17, no lytic lesions.

## 2018-02-28 PROBLEM — I87.2 VENOUS INSUFFICIENCY: Status: ACTIVE | Noted: 2018-01-01

## 2018-02-28 NOTE — PROGRESS NOTES
Subjective   Raina Jackson is a 88 y.o. female.   2/12/2018  Wt Readings from Last 1 Encounters:   02/28/18 49.1 kg (108 lb 3.2 oz)   She was last seen in October 20 1517, weight 103 then, 108 now.  I    She returns for evaluation of edema    History of Present Illness   Patient states that she has been having swelling, edema, of both lower extremities for the last 2-3 months.  She describes swelling of her feet and lower extremities up to about the mid thigh level bilaterally.  She temporarily increased her Bumex on my advice that did not seem to make a lot of difference.  She has hypertension which is treated with amlodipine 10 mg daily and Bumex .5 mg daily.  She does not have any cardiac history or symptoms, specifically no shortness of air or chest pain.    She has chronic kidney disease stage III and is also followed by Dr. Federico Cagle.  Her renal function has been stable.    She has some chronic anemia of mixed etiology and is followed by Dr. wade [facility].  She is also known to have multiple myeloma as well as iron deficiency.  Her B12 levels have been normal.    She has osteoporosis treated with Prolia at six-month intervals as well as calcium and vitamin D supplements.  She is not had any falls in the interim.  The following portions of the patient's history were reviewed and updated as appropriate: allergies, current medications, past family history, past medical history, past social history, past surgical history and problem list.    Review of Systems   Constitutional: Negative.    HENT: Negative.    Eyes: Negative.    Respiratory: Negative.    Cardiovascular: Positive for leg swelling.   Endocrine: Negative.    Genitourinary: Negative.    Skin: Negative.    Neurological: Negative.    Hematological: Negative.    Psychiatric/Behavioral: Negative.        Objective   Physical Exam   Constitutional: She appears well-developed and well-nourished.   HENT:   Head: Normocephalic and atraumatic.    Cardiovascular: Normal rate and regular rhythm.  Exam reveals no gallop and no friction rub.    No murmur heard.  Pulmonary/Chest: Effort normal and breath sounds normal. No respiratory distress. She has no wheezes. She has no rales.   Abdominal: Soft. Bowel sounds are normal. She exhibits no distension and no mass. There is no tenderness.   Musculoskeletal: She exhibits edema.   2+ pretibial edema bilateral lower extremities, with some stasis dermatitis changes bilateral distal lower extremities   Neurological: She is alert.   Skin: Skin is warm.   Psychiatric: Her behavior is normal.   Vitals reviewed.      Assessment/Plan   Raina was seen today for edema.    Diagnoses and all orders for this visit:    Essential hypertension  Comments:  Reduce amlodipine to 5 mg daily, increase Bumex to 1 mg daily (two 0.5 mg tabs)  Orders:  -     Comprehensive Metabolic Panel    Venous insufficiency  Comments:  Recommend elevating lower extremities 2-3 times a day for 10 or 15 minutes.  I do not think she would be able to handle compression stockings  Orders:  -     TSH    Chronic kidney disease, unspecified CKD stage  Comments:  We will check renal function and electrolytes and protein levels  Orders:  -     Comprehensive Metabolic Panel    Edema, unspecified type   -     TSH    Other orders  -     amLODIPine (NORVASC) 10 MG tablet; Take 0.5 tablets by mouth Daily.  -     bumetanide (BUMEX) 0.5 MG tablet; Take 2 tablets by mouth Daily.        Keep April follow-up as scheduled                       EMR Dragon/Transcription disclaimer:      Much of this encounter note is an electronic transcription/translation of spoken language to printed text. The electronic translation of spoken language may permit erroneous, or at times, nonsensical words or phrases to be inadvertently transcribed; Although I have reviewed the note for such errors, some may still exist.

## 2018-03-01 PROBLEM — E03.9 ACQUIRED HYPOTHYROIDISM: Status: ACTIVE | Noted: 2018-01-01

## 2018-03-23 NOTE — TELEPHONE ENCOUNTER
I would advise taking the thyroid medication first thing in the morning, one hour before breakfast or other medication.  I do not think that the grapefruit juice at breakfast would be a significant concern

## 2018-03-23 NOTE — TELEPHONE ENCOUNTER
Pt called and said that the directions of levothyroxine says to talk to your physician before eating or drinking grapefruit. Pt loves grapefruit and eats it for breakfast often. She would like to know if this would be okay. Please advise.

## 2018-04-23 NOTE — PROGRESS NOTES
Subjective   Raina Jackson is a 88 y.o. female.   3/19/2018  Wt Readings from Last 1 Encounters:   04/23/18 46.1 kg (101 lb 9.6 oz)   Was last seen in February 2018 for an acute care visit because of edema.    She returns for follow-up of hypertension, edema, chronic kidney disease, multiple myeloma, osteoporosis    History of Present Illness   She was seen in February 2018 for an acute care visit because of increased edema.  At that time, we reduced amlodipine from 10 mg to 5 mg and increased her Bumex from 1/2 mg to 1 mg.  She is had much less swelling.  Her current weight of about 102 is down from 108 in February, largely reflecting improved edema.  For comparison, her weight was 103 in October 2017.    She has chronic kidney disease stage III and is also followed by Dr. Federico KRUSE.  Renal function has been staying stable.    She has some chronic anemia of mixed etiology, followed by Dr. code [facility].  She is known to have multiple myeloma which has been smoldering.  Her bone survey 7 negative.    She has osteoporosis treated with Prolia at six-month intervals as well as calcium and vitamin D supplements.  She does describe having 2 falls.  She says that she just got off balance and fell, landing on the left hip both times, not sustaining any injury.  One of these falls may have occurred because her slippers slipped.    Her appetite is been somewhat less, probably because of undergoing some dental procedures.  She is had more intake of soups but these are symptoms that she makes herself so as to avoid the sodium content of canned soups  The following portions of the patient's history were reviewed and updated as appropriate: allergies, current medications, past family history, past medical history, past social history, past surgical history and problem list.    Review of Systems   Constitutional: Negative.    HENT: Negative.    Eyes: Negative.    Respiratory: Negative.    Cardiovascular: Positive for leg  swelling.   Endocrine: Negative.    Genitourinary: Negative.    Skin: Negative.    Neurological: Negative.    Hematological: Negative.    Psychiatric/Behavioral: Negative.        Objective   Physical Exam   Constitutional: She appears well-developed and well-nourished.   HENT:   Head: Normocephalic and atraumatic.   Cardiovascular: Normal rate and regular rhythm.  Exam reveals no gallop and no friction rub.    No murmur heard.  Pulmonary/Chest: Effort normal and breath sounds normal. No respiratory distress. She has no wheezes. She has no rales.   Abdominal: Soft. Bowel sounds are normal. She exhibits no distension and no mass. There is no tenderness.   Neurological: She is alert.   Skin: Skin is warm.   Psychiatric: Her behavior is normal.   Vitals reviewed.      Assessment/Plan   Raina was seen today for hypertension, chronic kidney disease, edema, osteoporosis and hypothyroidism.    Diagnoses and all orders for this visit:    Essential hypertension  Comments:  Continue amlodipine 5 mg daily and Bumex 1 mg daily  Orders:  -     Comprehensive Metabolic Panel    Acquired hypothyroidism  Comments:  Continue levothyroxin 75 µg daily, we will check thyroid levels  Orders:  -     TSH  -     T4, Free    Age-related osteoporosis with current pathological fracture, initial encounter  Comments:  Continue Os-Erwin D and a Prolia every 6 months, we'll check renal function and calcium    Iron deficiency anemia, unspecified iron deficiency anemia type  Comments:  Followed by Dr. Syed    Smoldering multiple myeloma (SMM)  Comments:  Followed by Dr. Syed [facility]    Stage 3 chronic kidney disease  Comments:  We will check renal function and electrolytes  Orders:  -     Comprehensive Metabolic Panel    She is given a prescription order for Shingrix vaccine    Schedule office follow-up about 6 months, return sooner if needed                           EMR Dragon/Transcription disclaimer:      Much of this encounter note is an  electronic transcription/translation of spoken language to printed text. The electronic translation of spoken language may permit erroneous, or at times, nonsensical words or phrases to be inadvertently transcribed; Although I have reviewed the note for such errors, some may still exist.

## 2018-06-04 NOTE — ED PROVIDER NOTES
Laceration Repair  Date: 1654  TD Status: Given today, 6/4/18  Discussed procedure with pt and available family.  Discussed pro/con, alternatives, risks, benefits, medications used, possible complications, anticipated outcomes, possible side effects, prognosis, possible scarring, possible infection, etc  Patient states understanding of the procedure being performed. Verbal consent obtained; Consent given by patient  Patient identity confirmed by patient arm band    Body Area: R knee  Type: Linear  Laceration length: 8cm  Contamination: None  Foreign Bodies: No foreign bodies seen  Tendon Involvement: None  Nerve involvement: None  Vascular Damage: None  Anesthesia: Local infiltration  Anesthesia Type: Lidocaine 1% w/ epi  Anesthetic Total: 4cc  Preparation: patient was prepped and draped in sterile fashion.  Wound was explored to the base in a bloodless field  Irrigation Solution: Saline  Amount of cleaning: Standard  Debridement: None  Skin Closure: 4-0 Ethilon  Number of Sutures: 11  Technique: Simple  Approximation: Close  Approximation Difficulty: No difficulty   Dressing: 4X4 sterile gauze and antibiotic ointment applied  Pt tolerated the procedure well with no immediate complications.     Body Area: L anterior forearm  Type: Linear  Laceration length: 3cm   Contamination: None  Foreign Bodies: no foreign bodies seen  Tendon Involvement: None  Nerve involvement: None  Vascular Damage: None  Anesthesia: Local infiltration  Anesthesia Type: Lidocaine 1% w/ epi  Anesthetic Total: 2 cc  Preparation: patient was prepped and draped in sterile fashion.  Wound was explored to the base in a bloodless field  Irrigation Solution: Saline  Amount of cleaning: Standard  Debridement: None  Skin Closure: 4-0 Ethilon  Number of Sutures: 3  Technique: Simple  Approximation: Close  Approximation Difficulty: No difficulty  Dressing: 4X4 sterile gauze, antibiotic ointment applied, and 2 steri strip's applied  Pt tolerated the  procedure well with no immediate complications.     1721 Laceration repair successfully completed with no complications. Pt tolerated procedure well. Further care and treatment will be proceeded by Dr. Haynes.    Documentation assistance provided by daina Betancourt for JAC Archibald.  Information recorded by the shanaibmelvin was done at my direction and has been verified and validated by me.     Belkis Betancourt  06/04/18 2007       DELORES Escobar  06/04/18 2021

## 2018-06-04 NOTE — ED PROVIDER NOTES
[facility] EMERGENCY DEPARTMENT ENCOUNTER    CHIEF COMPLAINT  Chief Complaint: Fall   History given by: Patient  History limited by: none  [facility] Room Number: 50/50  PMD: Allan Shea MD      HPI:  Pt is a 88 y.o. female who presents complaining of mechanical fall on concrete PTA, with abrasion to L forehead, nose, and laceration to R knee, as well as bruising to L forearm and R shin. Pt denies LOC and neck pain. Pt states she is not on blood thinners, and does not know if she is up to date on tetanus.     Onset: sudden  Duration: 1 time event, PTA  Severity: moderate  Associated symptoms: abrasion to L forehead, nose, laceration to R knee. Bruising to L forearm and R shin.   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   • Iron deficiency anemia 04/25/2016   • Skin lesion 05/27/2016   • Numbness 04/19/2017   • Iron and its compounds causing adverse effect in therapeutic use 07/14/2017   • Venous insufficiency 02/28/2018   • Acquired hypothyroidism 03/01/2018     Resolved Ambulatory Problems     Diagnosis Date Noted   • Monoclonal gammopathy 03/31/2016     Past Medical History:   Diagnosis Date   • Anemia    • Cataract    • Cystic mass of pancreatic head    • DVT (deep venous thrombosis) 08/2010   • Folate deficiency    • Frequent UTI    • Hypertension    • Hypogammaglobulinemia    • Left hip pain    • Myeloma    • Osteoporosis    • PICC (peripherally inserted central catheter) removal    • Renal insufficiency    • Small bowel perforation 07/2010   • Temporal arteritis 11/10/2005   • Torn rotator cuff 2007       PAST SURGICAL HISTORY  Past Surgical History:   Procedure  Laterality Date   • BLADDER REPAIR     • COLOSTOMY  2009    Patient had a ruptured sigmoid diverticulum which led to a diverting colostomy   • EYE SURGERY  2007    Cataract   • HERNIA REPAIR  2010    Ventral hernia repair from prior sigmoid resection   • HYSTERECTOMY     • JOINT REPLACEMENT      Bilateral knee replacements       FAMILY HISTORY  Family History   Problem Relation Age of Onset   • Cancer Neg Hx        SOCIAL HISTORY  Social History     Social History   • Marital status:      Spouse name: N/A   • Number of children: N/A   • Years of education: N/A     Occupational History   •  [employer]     Social History Main Topics   • Smoking status: Former Smoker   • Smokeless tobacco: Not on file   • Alcohol use No   • Drug use: Unknown   • Sexual activity: Not on file     Other Topics Concern   • Not on file     Social History Narrative    Spouse  from what sounds like cholangiocarcinoma.       ALLERGIES  Sulfa antibiotics    REVIEW OF SYSTEMS  Review of Systems   Constitutional: Negative for fever.   HENT: Negative for sore throat.    Eyes: Negative.    Respiratory: Negative for cough and shortness of breath.    Cardiovascular: Negative for chest pain.   Gastrointestinal: Negative for abdominal pain, diarrhea and vomiting.   Genitourinary: Negative for dysuria.   Musculoskeletal: Negative for neck pain.   Skin: Positive for color change (bruising to R knee and L forearm) and wound (abrasion to L forehead and nose, with laceration to R knee). Negative for rash.   Allergic/Immunologic: Negative.    Neurological: Negative for weakness, numbness and headaches.   Hematological: Negative.    Psychiatric/Behavioral: Negative.    All other systems reviewed and are negative.      PHYSICAL EXAM  ED Triage Vitals   Temp Heart Rate Resp BP SpO2   -- 18 1437 18 1437 18 1437 18 1512    80 16 126/66 100 %      Temp src Heart Rate Source Patient Position  BP Location FiO2 (%)   -- 06/04/18 1437 -- -- --    Monitor          Physical Exam   Constitutional: She is oriented to person, place, and time. No distress.   HENT:   Head: Head is with abrasion, with contusion and with right periorbital erythema (with ecchymosis).   Eyes: EOM are normal.   No hyphema.    Neck: Normal range of motion.   Cardiovascular: Normal rate and regular rhythm.    Pulmonary/Chest: Effort normal and breath sounds normal. No respiratory distress. She exhibits no tenderness.   Abdominal: There is no tenderness.   Musculoskeletal:        Right knee: She exhibits laceration. She exhibits normal range of motion. No tenderness found.   Neurological: She is alert and oriented to person, place, and time. She has normal sensation and normal strength.   Skin: Skin is warm and dry. Abrasion (L forehead. L elbow, and mid R tibia), bruising (R forearm) and laceration (R knee) noted.   Psychiatric: Affect normal.   Nursing note and vitals reviewed.    RADIOLOGY  CT Head Without Contrast   Final Result   1. There is moderate small vessel disease in cerebral white matter,   calcified plaques in the cavernous internal carotid arteries and   degenerative changes in the temporomandibular joints bilaterally, right   greater than left.   2. There is moderate to large scalp hematoma with an associated scalp   laceration over the anterior inferior left frontal bone extending left   periorbital fat and today's head and facial trauma. The remainder of the   head CT is normal with no acute skull fracture or intracranial   hemorrhage identified.       Orbital CT technique: Spiral CT images were obtained through the facial   bones and the axial imaging plane images were reformatted and are   submitted in 2 mm thick axial CT sections with bone and soft tissue   algorithm and additional 3 mm thick sagittal and coronal reconstructions   were performed to complete the facial CT.       FINDINGS: There is moderate to large  scalp hematoma over the anterior   inferior left frontal bone extending left periorbital fat from today's   head and facial trauma. No underlying acute skull fracture or facial   fractures identified. The paranasal sinuses are clear. The orbital   outlines are smooth. The extraocular muscles and optic nerves have a   normal noncontrast appearance and the intraconal and extraconal fat of   the orbits is clean.       IMPRESSION: Moderate to large scalp hematoma over the anterior inferior   left frontal bone with associated scalp laceration with bubbles of air   in the scalp and hematoma extends in the left periorbital fat. Otherwise   negative orbital CT with no orbital fracture identified.       Cervical spine CT technique: Spiral CT images were obtained from the   skull base to the T2-T3 thoracic level and images were reformatted and   submitted in 2 mm thick axial CT sections with soft tissue algorithm and   1 mm thick axial CT section with high-resolution bone algorithm and 2 mm   thick sagittal and coronal reconstructions were performed with both bone   and soft tissue algorithm.       FINDINGS: There are arthritic changes at the atlantodental interval with   joint space narrowing and marginal spurring off the anterior ring of C1   and the odontoid. Otherwise the C1-2 level is normal in appearance.   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       At C2-3 there is moderate bilateral facet overgrowth. There is a 1 to 2   mm retrolisthesis of C2 with respect to C3. I see no canal or foraminal   narrowing.       At C3-4 there is partial bony fusion across the left facet joint. Solid   bony fusion across the right facet joint. The posterior disc margins are   normal. There is no canal or foraminal narrowing at C3-4.       At C4-5 there is moderate bilateral facet overgrowth and 2 mm   degenerative anterolisthesis of  C4 on C5. There is essentially no canal   narrowing. There is mild left and mild-to-moderate right bony foraminal   narrowing.       At C5-6 there is mild bilateral facet overgrowth. There is disc space   narrowing, degenerative endplate change and mild diffuse posterior disc   osteophyte complex and mild bilateral uncovertebral joint hypertrophy   and there is mild canal and mild bilateral bony foraminal narrowing.       At C6-7 there is mild/moderate right and minimal left facet overgrowth   disc space narrowing, degenerative endplate change, diffuse posterior   disc aspect complexes and some bilateral uncovertebral joint hypertrophy   and there is mild canal and mild right and mild-to-moderate left bony   foraminal narrowing.       At C7-T1 there is mild to moderate bilateral facet overgrowth. A 2 mm   degenerative anterolisthesis of C7 on T1. There is no canal narrowing.   There is mild bilateral bony foraminal narrowing.       IMPRESSION: No acute fracture is seen in the cervical spine. There is   mild cervical spondylosis as described in great detail above.               Radiation dose reduction techniques were utilized, including automated   exposure control and exposure modulation based on body size.       This report was finalized on 6/4/2018 5:53 PM by Dr. Ulises Ring M.D.          CT Cervical Spine Without Contrast   Final Result   1. There is moderate small vessel disease in cerebral white matter,   calcified plaques in the cavernous internal carotid arteries and   degenerative changes in the temporomandibular joints bilaterally, right   greater than left.   2. There is moderate to large scalp hematoma with an associated scalp   laceration over the anterior inferior left frontal bone extending left   periorbital fat and today's head and facial trauma. The remainder of the   head CT is normal with no acute skull fracture or intracranial   hemorrhage identified.       Orbital CT technique: Spiral CT  images were obtained through the facial   bones and the axial imaging plane images were reformatted and are   submitted in 2 mm thick axial CT sections with bone and soft tissue   algorithm and additional 3 mm thick sagittal and coronal reconstructions   were performed to complete the facial CT.       FINDINGS: There is moderate to large scalp hematoma over the anterior   inferior left frontal bone extending left periorbital fat from today's   head and facial trauma. No underlying acute skull fracture or facial   fractures identified. The paranasal sinuses are clear. The orbital   outlines are smooth. The extraocular muscles and optic nerves have a   normal noncontrast appearance and the intraconal and extraconal fat of   the orbits is clean.       IMPRESSION: Moderate to large scalp hematoma over the anterior inferior   left frontal bone with associated scalp laceration with bubbles of air   in the scalp and hematoma extends in the left periorbital fat. Otherwise   negative orbital CT with no orbital fracture identified.       Cervical spine CT technique: Spiral CT images were obtained from the   skull base to the T2-T3 thoracic level and images were reformatted and   submitted in 2 mm thick axial CT sections with soft tissue algorithm and   1 mm thick axial CT section with high-resolution bone algorithm and 2 mm   thick sagittal and coronal reconstructions were performed with both bone   and soft tissue algorithm.       FINDINGS: There are arthritic changes at the atlantodental interval with   joint space narrowing and marginal spurring off the anterior ring of C1   and the odontoid. Otherwise the C1-2 level is normal in appearance.   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       At C2-3 there is moderate bilateral facet overgrowth. There is a 1 to 2   mm retrolisthesis of C2 with respect to C3. I see no canal  or foraminal   narrowing.       At C3-4 there is partial bony fusion across the left facet joint. Solid   bony fusion across the right facet joint. The posterior disc margins are   normal. There is no canal or foraminal narrowing at C3-4.       At C4-5 there is moderate bilateral facet overgrowth and 2 mm   degenerative anterolisthesis of C4 on C5. There is essentially no canal   narrowing. There is mild left and mild-to-moderate right bony foraminal   narrowing.       At C5-6 there is mild bilateral facet overgrowth. There is disc space   narrowing, degenerative endplate change and mild diffuse posterior disc   osteophyte complex and mild bilateral uncovertebral joint hypertrophy   and there is mild canal and mild bilateral bony foraminal narrowing.       At C6-7 there is mild/moderate right and minimal left facet overgrowth   disc space narrowing, degenerative endplate change, diffuse posterior   disc aspect complexes and some bilateral uncovertebral joint hypertrophy   and there is mild canal and mild right and mild-to-moderate left bony   foraminal narrowing.       At C7-T1 there is mild to moderate bilateral facet overgrowth. A 2 mm   degenerative anterolisthesis of C7 on T1. There is no canal narrowing.   There is mild bilateral bony foraminal narrowing.       IMPRESSION: No acute fracture is seen in the cervical spine. There is   mild cervical spondylosis as described in great detail above.               Radiation dose reduction techniques were utilized, including automated   exposure control and exposure modulation based on body size.       This report was finalized on 6/4/2018 5:53 PM by Dr. Ulises Ring M.D.          CT Orbits Without Contrast   Final Result   1. There is moderate small vessel disease in cerebral white matter,   calcified plaques in the cavernous internal carotid arteries and   degenerative changes in the temporomandibular joints bilaterally, right   greater than left.   2. There is  moderate to large scalp hematoma with an associated scalp   laceration over the anterior inferior left frontal bone extending left   periorbital fat and today's head and facial trauma. The remainder of the   head CT is normal with no acute skull fracture or intracranial   hemorrhage identified.       Orbital CT technique: Spiral CT images were obtained through the facial   bones and the axial imaging plane images were reformatted and are   submitted in 2 mm thick axial CT sections with bone and soft tissue   algorithm and additional 3 mm thick sagittal and coronal reconstructions   were performed to complete the facial CT.       FINDINGS: There is moderate to large scalp hematoma over the anterior   inferior left frontal bone extending left periorbital fat from today's   head and facial trauma. No underlying acute skull fracture or facial   fractures identified. The paranasal sinuses are clear. The orbital   outlines are smooth. The extraocular muscles and optic nerves have a   normal noncontrast appearance and the intraconal and extraconal fat of   the orbits is clean.       IMPRESSION: Moderate to large scalp hematoma over the anterior inferior   left frontal bone with associated scalp laceration with bubbles of air   in the scalp and hematoma extends in the left periorbital fat. Otherwise   negative orbital CT with no orbital fracture identified.       Cervical spine CT technique: Spiral CT images were obtained from the   skull base to the T2-T3 thoracic level and images were reformatted and   submitted in 2 mm thick axial CT sections with soft tissue algorithm and   1 mm thick axial CT section with high-resolution bone algorithm and 2 mm   thick sagittal and coronal reconstructions were performed with both bone   and soft tissue algorithm.       FINDINGS: There are arthritic changes at the atlantodental interval with   joint space narrowing and marginal spurring off the anterior ring of C1   and the odontoid.  Otherwise the C1-2 level is normal in appearance.   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       At C2-3 there is moderate bilateral facet overgrowth. There is a 1 to 2   mm retrolisthesis of C2 with respect to C3. I see no canal or foraminal   narrowing.       At C3-4 there is partial bony fusion across the left facet joint. Solid   bony fusion across the right facet joint. The posterior disc margins are   normal. There is no canal or foraminal narrowing at C3-4.       At C4-5 there is moderate bilateral facet overgrowth and 2 mm   degenerative anterolisthesis of C4 on C5. There is essentially no canal   narrowing. There is mild left and mild-to-moderate right bony foraminal   narrowing.       At C5-6 there is mild bilateral facet overgrowth. There is disc space   narrowing, degenerative endplate change and mild diffuse posterior disc   osteophyte complex and mild bilateral uncovertebral joint hypertrophy   and there is mild canal and mild bilateral bony foraminal narrowing.       At C6-7 there is mild/moderate right and minimal left facet overgrowth   disc space narrowing, degenerative endplate change, diffuse posterior   disc aspect complexes and some bilateral uncovertebral joint hypertrophy   and there is mild canal and mild right and mild-to-moderate left bony   foraminal narrowing.       At C7-T1 there is mild to moderate bilateral facet overgrowth. A 2 mm   degenerative anterolisthesis of C7 on T1. There is no canal narrowing.   There is mild bilateral bony foraminal narrowing.       IMPRESSION: No acute fracture is seen in the cervical spine. There is   mild cervical spondylosis as described in great detail above.               Radiation dose reduction techniques were utilized, including automated   exposure control and exposure modulation based on body size.       This report was finalized on 6/4/2018  5:53 PM by Dr. Ulises Ring M.D.          XR Forearm 2 View Left   Preliminary Result   No evidence of acute abnormality at the left forearm.          XR Knee 1 or 2 View Right   Preliminary Result   Negative right knee x-ray.               I ordered the above noted radiological studies. Interpreted by radiologist. Discussed with radiologist (Loc). Reviewed by me in PACS.     Procedures      PROGRESS AND CONSULTS     1533  CT head, orbits, and C-spine ordered due to hitting head on fall. XR R knee and XR L forearm ordered due to pt's injuries. Norco ordered for pain management, T-dap ordered for tetanus update.     1615  Discussed pt's case with DELORES Archibald who agrees to repair pt's lacerations prior to disposition.     1645  Discussed pt's case with José Miguel following assessment of pt. José Miguel agrees with plan to repair pt's skin tears and laceration.     1716  DELORES Valdez, states pt tolerated procedures well, without complication. Requested bulk dressing of pt's knee to prevent movement or tearing.     1737  Pt rechecked and resting comfortably. Discussed results of CTs and XRs with plan for discharge with dressed wounds due to lack of acute fractures. Pt was directed to follow up with PCP for suture removal and recheck, as well as to keep wounds clean and dry and redress wounds in 2 days. Discussed plan for discharge with abx and pain medication. Pt understands and agrees with plan, all questions addressed.     MEDICAL DECISION MAKING  Results were reviewed/discussed with the patient and they were also made aware of online access. Pt also made aware that some labs, such as cultures, will not be resulted during ER visit and follow up with PMD is necessary.     MDM  Number of Diagnoses or Management Options     Amount and/or Complexity of Data Reviewed  Tests in the radiology section of CPT®: ordered and reviewed (CT head, c-spine, and orbits - negative acute  XR knee and XR forearm - no acute  fracture or dislocation )           DIAGNOSIS  Final diagnoses:   Fall, initial encounter   Periorbital ecchymosis of left eye, initial encounter   Knee laceration, right, initial encounter   Abrasion of forehead, initial encounter   Closed head injury, initial encounter   Forearm laceration, left, initial encounter       DISPOSITION  DISCHARGE    Patient discharged in stable condition.    Reviewed implications of results, diagnosis, meds, responsibility to follow up, warning signs and symptoms of possible worsening, potential complications and reasons to return to ER.    Patient/Family voiced understanding of above instructions.    Discussed plan for discharge, as there is no emergent indication for admission. Patient referred to primary care provider for BP management due to today's BP. Pt/family is agreeable and understands need for follow up and repeat testing.  Pt is aware that discharge does not mean that nothing is wrong but it indicates no emergency is present that requires admission and they must continue care with follow-up as given below or physician of their choice.     FOLLOW-UP  Allan Shea MD  [address] [address]  [phone]    In 10 days  For suture removal         Medication List      New Prescriptions    cephalexin 500 MG capsule  Commonly known as:  KEFLEX  Take 1 capsule by mouth 2 (Two) Times a Day.     HYDROcodone-acetaminophen 7.5-325 MG per tablet  Commonly known as:  NORCO  Take 1 tablet by mouth Every 4 (Four) Hours As Needed for Moderate Pain .              Latest Documented Vital Signs:  As of 6:54 PM  BP- 176/81 HR- 86 Temp- 98.7 °F (37.1 °C) O2 sat- 99%    --  Documentation assistance provided by daina Corrales for Dr. Haynes.  Information recorded by the scribe was done at my direction and has been verified and validated by me.              Jenelle Corrales  06/04/18 [id number]       Tyler Haynes MD  06/04/18 3008

## 2018-06-04 NOTE — ED NOTES
Wounds to right leg, left arm and elbow, and to above left orbital irrigated with normal saline.  Applied steri strips to distal wound on right leg, covered with adaptic and covered with kerlix.  Sewed laceration to right knee, applied non adherent and kerlix x3 per NPAriela to promote stabilization.  Patient tolerated well.  Laceration to left forearm dressed with nonadherent pad.  Wound to left arm, applied bacitracin and dressed with island dressing.  Wounds to left arm covered with kerlix and secured with tape.  Wound to above left eye, steri strips applied with mastisol, thrombi pad applied and island dressing to cover wound left side of forehead.     Jason Andrews RN  06/04/18 [id number]

## 2018-06-18 NOTE — TELEPHONE ENCOUNTER
Pt's son called and said they have an appt on Friday for a follow up after a fall last week. Pt is still in some pain and the ER had given her hydrocodone and she is now out of that medication. Pt's son would like to know if we could prescribe something to help with her pain. Please advise.
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
## 2018-06-22 NOTE — PROGRESS NOTES
Subjective   Raina Jackson is a 88 y.o. female.   4/23/2018  Wt Readings from Last 1 Encounters:   06/22/18 43.1 kg (95 lb)   She was last seen April 23 for routine follow-up.    She returns for acute care visit because of emergency department visit Monica four due to a fall    History of Present Illness   The patient states that she was at a gas station on June 4, had a pump gas, and fell onto the concrete.  She really does not know how she fell.  She did not have loss of consciousness.  She had abrasions contusion laceration and hematoma is.  She had 11 sutures to the right knee area, 3 sutures to the left forearm, and she had ecchymoses around both eyes and a left frontal hematoma.  She is had soreness.  She has been taking some mild analgesics.  It is associate with some constipation.  We discussed the use of MiraLAX when necessary for constipation.  She needs something mostly at night to help with sleep and we discussed using Benadryl.  We've also discussed home health for physical therapy occupational therapy and general nursing.  Her evaluation included CT scan of the head and the cervical spine with no acute findings other than a hematoma  The following portions of the patient's history were reviewed and updated as appropriate: allergies, current medications, past family history, past medical history, past social history, past surgical history and problem list.    Review of Systems   Constitutional: Negative.    HENT: Negative.    Eyes: Negative.    Respiratory: Negative.    Cardiovascular: Negative.    Endocrine: Negative.    Genitourinary: Negative.    Musculoskeletal: Positive for arthralgias.   Skin: Negative.    Neurological: Negative.    Hematological: Negative.    Psychiatric/Behavioral: Negative.        Objective   Physical Exam   Constitutional: She appears well-developed and well-nourished.   HENT:   Resolving ecchymoses under both eyes, hematoma left 4 head with abrasion   Skin:   Sutures removed  from the right knee wound in the left forearm wound, no infection evident       Assessment/Plan   Raina was seen today for fall.    Diagnoses and all orders for this visit:    Fall, subsequent encounter  Comments:  Fall on June 4, sutures removed right knee and left forearm, abrasion left dale cleaned.  Will request home health, physical therapy, and occupational thera          Schedule office follow-up about 3 months, return sooner if needed                     EMR Dragon/Transcription disclaimer:      Much of this encounter note is an electronic transcription/translation of spoken language to printed text. The electronic translation of spoken language may permit erroneous, or at times, nonsensical words or phrases to be inadvertently transcribed; Although I have reviewed the note for such errors, some may still exist.

## 2018-07-11 NOTE — TELEPHONE ENCOUNTER
The x-rays did not show a fracture

## 2018-07-12 NOTE — TELEPHONE ENCOUNTER
Pt's home health nurse called and said the incision on her knee seems to have a suture that has worked its way off that may or may not be attached but possibly attached to the scab. There is no irritation or pain. The scab is not ready to fall off yet but possibly in a week or two. She said they can go out again next week to reassess if okay with you and remove if it isn't just attached to the scab itself. Please advise.

## 2018-09-19 NOTE — TELEPHONE ENCOUNTER
Pt called and said she thinks she has Gout in her Right hand and it is not getting any better. Please advise.

## 2018-09-21 NOTE — PROGRESS NOTES
Subjective   Raina Jackson is a 89 y.o. female.   6/22/2018  Wt Readings from Last 1 Encounters:   09/21/18 44.5 kg (98 lb)   She was last seen in June.    She returns for acute care visit because of right hand pain    History of Present Illness   She developed swelling in her right hand on Sunday.  No trauma described.  This is involved primarily the metacarpophalangeal joints.  She wonders if it could be gout.  There was increased swelling on Tuesday and she called for evaluation.  It is gone down some in the interim.  The following portions of the patient's history were reviewed and updated as appropriate: allergies, current medications, past family history, past medical history, past social history, past surgical history and problem list.    Review of Systems   Musculoskeletal: Positive for arthralgias.       Objective   Physical Exam   Musculoskeletal:   There is mild thickening of the right MCP joints to 3 and 4.  No ecchymoses, no trauma identified       Assessment/Plan   Raina was seen today for arthritis.    Diagnoses and all orders for this visit:    Arthritis  Comments:  We will treat with prednisone 10 mg tapering dose, call if not resolved after that    Other orders  -     predniSONE (DELTASONE) 10 MG tablet; 2 by mouth twice a day for 4 days, then one twice a day for 4 days, then 1 every morning until taken        Cancel October 1, schedule follow-up mid-November                       EMR Dragon/Transcription disclaimer:      Much of this encounter note is an electronic transcription/translation of spoken language to printed text. The electronic translation of spoken language may permit erroneous, or at times, nonsensical words or phrases to be inadvertently transcribed; Although I have reviewed the note for such errors, some may still exist.

## 2018-11-19 NOTE — PROGRESS NOTES
Noted calcium up to 11.6.  Also noted gradually worsening anemia and gradually worsening creatinine.  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    I have recommended Xgeva every 4 weeks.  I have not recommended Zometa because of her underlying renal dysfunction.    I have asked her to have a PET scan instead of a bone survey because I would like a sensitive test to look for lytic lesions from myeloma.  This will help to make a decision on whether or not to start Revlimid.    I have asked the patient to have a PET scan and 24 hour urine studies prior to her appointment with me.    I will see her on dose #2 of Xgeva.    I discussed all this with patient and son over the phone in a 24 minute phone call.        This is the note I sent to the scheduling desk:     Please arrange Xgeva every 4 weeks.  First dose either tomorrow or the next day.  When she is in the office, give her a jug to collect her urine    Arrange a PET scan for multiple myeloma December 11    Change the upcoming appointment with me to be 4 weeks after the first dose of Xgeva.  When she sees me she needs Xgeva that day with CBC and stat BMP.  The appointment with me needs to be a 2 unit.    Please ask Jerson to enter orders for PET scan, the Xgeva care plan, and orders for 24 hour urine for UPEP, urine KATIE, urine free light chains.  Also the CBC and stat BMP for the day she sees me.    Again, the appointment with me is a 2 unit.

## 2018-11-20 PROBLEM — C90.00 MULTIPLE MYELOMA NOT HAVING ACHIEVED REMISSION (HCC): Status: ACTIVE | Noted: 2018-01-01

## 2018-11-20 PROBLEM — E83.52 HYPERCALCEMIA: Status: ACTIVE | Noted: 2018-01-01

## 2018-11-20 NOTE — TELEPHONE ENCOUNTER
----- Message from Gregory Syed II, MD sent at 11/19/2018  4:30 PM EST -----   Please arrange Xgeva every 4 weeks.  First dose either tomorrow or the next day.  When she is in the office, give her a jug to collect her urine    Arrange a PET scan for multiple myeloma December 11    Change the upcoming appointment with me to be 4 weeks after the first dose of Xgeva.  When she sees me she needs Xgeva that day with CBC and stat BMP.  The appointment with me needs to be a 2 unit.    Please ask Jerson to enter orders for PET scan, the Xgeva care plan, and orders for 24 hour urine for UPEP, urine KATIE, urine free light chains.  Also the CBC and stat BMP for the day she sees me.    Again, the appointment with me is a 2 unit.

## 2018-11-20 NOTE — PROGRESS NOTES
Per inbox message from Dr Syed, Xgeva careplan entered with stat labs associated with that plan, PET scan ordered, and order entered for 24 hour urine for KATIE, UPEP, Free light chains.

## 2018-11-21 NOTE — PROGRESS NOTES
Last calcium level was 11.6.  Patient states that the Dr told her to stop her calcium supplement.

## 2018-12-10 PROBLEM — T14.8XXA ABRASION: Status: ACTIVE | Noted: 2018-01-01

## 2018-12-10 NOTE — PROGRESS NOTES
Subjective   Raina Jackson is a 89 y.o. female.   11/19/2018  Wt Readings from Last 1 Encounters:   12/10/18 43.6 kg (96 lb 3.2 oz)   She was last seen in September 2018    She she returns because of wound right pretibial area    History of Present Illness   She has history of hypertension, chronic kidney disease, anemia and myeloma.  She is followed by Dr. wade of [facility].  She has an IgG A light chain myeloma.  Her last hemoglobin was checked in November and it was 9.1.  Iron studies were low.  She is being followed by Dr. wade because of the anemia and myeloma.    She had a fall at home the right before Thanksgiving.  She got her feet tangled up in her walker and fell in her room.  She sustained an abrasion in the right pretibial area.  This was dressed with a Band-Aid but when the Band-Aid was pulled off, the wound enlarged.  It continues to be painful and not yet healed.  The following portions of the patient's history were reviewed and updated as appropriate: allergies, current medications, past family history, past medical history, past social history, past surgical history and problem list.    Review of Systems   Constitutional: Negative.    HENT: Negative.    Eyes: Negative.    Respiratory: Negative.    Cardiovascular: Negative.    Endocrine: Negative.    Genitourinary: Negative.    Skin: Positive for wound.   Neurological: Negative.    Hematological: Negative.    Psychiatric/Behavioral: Negative.        Objective   Physical Exam   Constitutional: She appears well-developed and well-nourished.   HENT:   Head: Normocephalic and atraumatic.   Cardiovascular: Normal rate and regular rhythm. Exam reveals no gallop and no friction rub.   No murmur heard.  Pulmonary/Chest: Effort normal and breath sounds normal. No respiratory distress. She has no wheezes. She has no rales.   Abdominal: Soft. Bowel sounds are normal. She exhibits no distension and no mass. There is no tenderness.   Neurological: She is  alert.   Skin: Skin is warm.   There is a 2 x 4 cm abrasion over the mid right pretibial area with some surrounding tenderness but no exudate or erythema   Psychiatric: Her behavior is normal.   Vitals reviewed.      Assessment/Plan   Raina was seen today for hypertension, chronic kidney disease, anemia, arthritis and wound check.    Diagnoses and all orders for this visit:    Abrasion  Comments:  The right mid pretibial wound should be cleansed daily with soap and water, dress with Bactroban ointment twice daily and covered with gauze held in place by an elastic bandage, not tape.    Other orders  -     mupirocin (BACTROBAN) 2 % nasal ointment; Apply topically twice a day to right leg wound                               EMR Dragon/Transcription disclaimer:      Much of this encounter note is an electronic transcription/translation of spoken language to printed text. The electronic translation of spoken language may permit erroneous, or at times, nonsensical words or phrases to be inadvertently transcribed; Although I have reviewed the note for such errors, some may still exist.

## 2018-12-18 NOTE — PROGRESS NOTES
Subjective .     REASONS FOR FOLLOWUP:  multiple myeloma, anemia    HISTORY OF PRESENT ILLNESS:  The patient is a 89 y.o. year old female  who is here for follow-up with the above-mentioned history.    Since the last M.D. visit, calcium worsened 11.3 and Xgeva was started.  Today she is here for her second monthly dose of Xgeva.    Today creatinine is worsened 3.  BUN is 60.  Sons report she drinks less than 1 small bottle of water per day.    Denies nausea.  Denies weight loss.    Chronic shoulder pain bilaterally, not felt to be related to multiple myeloma.    Past Medical History:   Diagnosis Date   • Anemia     Secondary to chronic renal insufficiency and folate insufficiency   • Cataract    • Cystic mass of pancreatic head     On CT of 01/16/2009. 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   • Osteoporosis    • PICC (peripherally inserted central catheter) removal     Secondary to DVT   • Renal insufficiency     Chronic   • Small bowel perforation (CMS/HCC) 07/2010    Small bowel and cecal perforation with abcess formation and infected mesh from prior hernia repair   • Temporal arteritis (CMS/HCC) 11/10/2005   • Torn rotator cuff 2007     Past Surgical History:   Procedure Laterality Date   • BLADDER REPAIR  1995   • CATARACT EXTRACTION  2009   • COLONOSCOPY  1997   • COLONOSCOPY  2009   • COLOSTOMY  01/2009    Patient had a ruptured sigmoid diverticulum which led to a diverting colostomy   • COLOSTOMY CLOSURE     • CORRECTION HAMMER TOE  1995   • ENDOSCOPY     • EYE SURGERY  04/2007    Cataract   • GLAUCOMA  SURGERY  2004   • HERNIA REPAIR  04/26/2010    Ventral hernia repair from prior sigmoid resection   • HYSTERECTOMY  1995   • INCISIONAL HERNIA REPAIR  2010    Dr. Mcgee   • JOINT REPLACEMENT  2002    Bilateral knee replacements       HEMATOLOGIC/ONCOLOGIC HISTORY:  (History from previous dates can be found in the separate document.)    MEDICATIONS    Current Outpatient Medications:   •  acetaminophen (TYLENOL) 500 MG tablet, Take 1 tablet by mouth Every 6 (Six) Hours As Needed for mild pain (1-3)., Disp: 30 tablet, Rfl: 0  •  amLODIPine (NORVASC) 5 MG tablet, TAKE 1 TABLET BY MOUTH ONE TIME A DAY , Disp: 90 tablet, Rfl: 0  •  B Complex Vitamins (VITAMIN B COMPLEX) capsule capsule, Take 1 capsule by mouth Daily., Disp: , Rfl:   •  bumetanide (BUMEX) 1 MG tablet, TAKE 1 TABLET BY MOUTH ONE TIME A DAY , Disp: 90 tablet, Rfl: 0  •  calcium-vitamin D (OSCAL-500) 500-200 MG-UNIT per tablet, Take 1 tablet by mouth., Disp: , Rfl:   •  cephalexin (KEFLEX) 500 MG capsule, Take 1 capsule by mouth 2 (Two) Times a Day., Disp: 14 capsule, Rfl: 0  •  cholecalciferol (VITAMIN D3) 1000 UNITS tablet, Take 1,000 Units by mouth Daily., Disp: , Rfl:   •  HYDROcodone-acetaminophen (NORCO) 7.5-325 MG per tablet, Take 1 tablet by mouth Every 4 (Four) Hours As Needed for Moderate Pain ., Disp: 24 tablet, Rfl: 0  •  levothyroxine (SYNTHROID, LEVOTHROID) 75 MCG tablet, Take 1 tablet by mouth Daily., Disp: 90 tablet, Rfl: 1  •  levothyroxine (SYNTHROID, LEVOTHROID) 75 MCG tablet, TAKE 1 TABLET BY MOUTH ONE TIME A DAY , Disp: 90 tablet, Rfl: 0  •  MethylPREDNISolone (MEDROL, NILA,) 4 MG tablet, Take as directed on package instructions., Disp: 21 each, Rfl: 0  •  Multiple Vitamins-Calcium (ONE-A-DAY WOMENS PO), Take  by mouth., Disp: , Rfl:   •  mupirocin (BACTROBAN) 2 % nasal ointment, Apply topically twice a day to right leg wound, Disp: 15 g, Rfl: 2  •  predniSONE (DELTASONE) 10 MG tablet, 2 by mouth twice a day for 4 days, then one twice a  day for 4 days, then 1 every morning until taken, Disp: 28 tablet, Rfl: 0    ALLERGIES:     Allergies   Allergen Reactions   • Sulfa Antibiotics        SOCIAL HISTORY:       Social History     Socioeconomic History   • Marital status:      Spouse name: Not on file   • Number of children: Not on file   • Years of education: Not on file   • Highest education level: Not on file   Social Needs   • Financial resource strain: Not on file   • Food insecurity - worry: Not on file   • Food insecurity - inability: Not on file   • Transportation needs - medical: Not on file   • Transportation needs - non-medical: Not on file   Occupational History     Employer: [employer]     Employer: RETIRED   Tobacco Use   • Smoking status: Former Smoker   • Smokeless tobacco: Never Used   Substance and Sexual Activity   • Alcohol use: No   • Drug use: No   • Sexual activity: Not on file   Other Topics Concern   • Not on file   Social History Narrative    Spouse  from what sounds like cholangiocarcinoma.         FAMILY HISTORY:  Family History   Problem Relation Age of Onset   • Cancer Neg Hx        REVIEW OF SYSTEMS:  Review of Systems   Constitutional: Negative for activity change.   HENT: Negative for nosebleeds and trouble swallowing.    Respiratory: Negative for shortness of breath and wheezing.    Cardiovascular: Negative for chest pain and palpitations.   Gastrointestinal: Negative for constipation, diarrhea and nausea.   Genitourinary: Negative for dysuria and hematuria.   Musculoskeletal: Negative for arthralgias and myalgias.   Skin: Negative for rash and wound.   Neurological: Negative for seizures and syncope.   Hematological: Negative for adenopathy. Does not bruise/bleed easily.   Psychiatric/Behavioral: Negative for confusion.          Objective    Vitals:    18 1253   BP: 176/83  Comment: pediatric cuff   Pulse: 83   Resp: 16   Temp: 97.5 °F (36.4 °C)   TempSrc: Oral   SpO2: 100%   Weight:  "43.1 kg (95 lb)   Height: 152.4 cm (60\")   PainSc:   7   PainLoc: Leg  Comment: pt fell nov 22  R leg  [facility]TS came to house     Current Status 12/20/2018   ECOG score 1      PHYSICAL EXAM:    CONSTITUTIONAL:  Vital signs reviewed.  No distress, looks comfortable.  Frail elderly female.  EYES:  Conjunctiva and lids unremarkable.  PERRLA  EARS,NOSE,MOUTH,THROAT:  Ears and nose appear unremarkable.  Lips, teeth, gums appear unremarkable.  RESPIRATORY:  Normal respiratory effort.  Lungs clear to auscultation bilaterally.  CARDIOVASCULAR:  Normal S1, S2.  No murmurs rubs or gallops.  No significant lower extremity edema.  GASTROINTESTINAL: Abdomen appears unremarkable.  Nontender.  No hepatomegaly.  No splenomegaly.  LYMPHATIC:  No cervical, supraclavicular, axillary lymphadenopathy.  SKIN:  Warm.  No rashes.  Right leg wound from fall does not look infected.  PSYCHIATRIC:  Normal judgment and insight.  Normal mood and affect.    RECENT LABS:        WBC   Date/Time Value Ref Range Status   12/20/2018 12:36 PM 4.78 4.50 - 10.70 10*3/mm3 Final     Hemoglobin   Date/Time Value Ref Range Status   12/20/2018 12:36 PM 9.5 (L) 11.9 - 15.5 g/dL Final     Platelets   Date/Time Value Ref Range Status   12/20/2018 12:36  140 - 500 10*3/mm3 Final       Assessment/Plan     ASSESSMENT:  *IgG kappa multiple myeloma.  (Bone marrow 3/31/11 revealed 10% monoclonal IgG kappa plasma cells.  This was smoldering myeloma until she became symptomatic from a calcium of 11.6 on 11/19/18)  Main issue is anemia was anemia which was successfully treated with Procrit.  However, developed a calcium 11.6 on 11/19/18 and creatinine rise up to 3 on 12/20/18.  PET 12/11/18 negative for myeloma bone lesions  · Serum M spike stable at 0.8.  · Kappa/lambda ratio in urine 111 (no prior for comparison).  We do not have a recent serum kappa/lambda ratio.  I have added this to today's labs.  I discussed this case with Dr. Danielle from our practice and he agrees " with my assessment of this now being multiple myeloma and with the need to begin treatment.  Also discussed this with Dr. Federico Cagle, her nephrologist.  He agrees the kidney dysfunction is likely from multiple myeloma.  Typically, IUs Revlimid 15 mg daily D1-21/28 days with Decadron 20 mg weekly for elderly patients.  However, with her renal dysfunction this would need to be dose reduced, likely to 2.5 mg daily D1-21/28 days.  With renal dysfunction, leukocytopenia can become more problematic from Revlimid.  Therefore, generally Revlimid is avoided with acute renal insufficiency.  Therefore, I have recommended Velcade weekly 3 out of 4 weeks with Decadron 20 mg weekly.  I reviewed side effects.  She had chemotherapy teaching today.  Patient and sons want to proceed with therapy.  They understand this is not curable.  Transportation is problematic and therefore weekly Velcade is not a good long-term solution for her.  Furthermore, she is frail with limited mobility and has bilateral finger numbness, suspected to be due to cervical disc disease.  I think significant peripheral neuropathy from Velcade would be quite problematic for her quality of life.  My plan is to hopefully improve creatinine with Velcade and then switch to Revlimid and Decadron.    *Hypercalcemia.  Calcium highest value 11.6 on 11/19/18.  · Xgeva, monthly, dose 1 on 11/21/18.  · Calcium normal currently.  Continue Xgeva monthly.    *Iron deficiency anemia.  (In the past, difficulty tolerating by mouth iron).  However, has been on iron 3 times per week since the fall 2012.  Hemoglobin is stable.  History of Feraheme  Last iron labs November 2018, unremarkable.    *Anemia of chronic renal insufficiency, stage III.  She likes to minimize Procrit.  Has not received Procrit for quite some time.  Sees Dr. Federico Cagle.    *Acute kidney injury on chronic kidney disease, stage III.  Creatinine gradually worsening.  From 1.4-1.5, up to 1.8-1.9.   Creatinine carlitos to 3 on 12/20/18, prompting an urgent start of Velcade and Decadron.    *PICC associated DVT August 2010.  Completed 6 months Coumadin 3/20/11.  (If treatment for myeloma is given, will need to keep this in mind, since she had a clot in the past).    *Leg wound after a fall.  Occurred around late November 2018.  Wound is healing.  Patient and sons understand neutropenia from chemotherapy can delay healing and increase the risk of infection.    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  Completed 2 years of CAT scan follow-up 02/02/11.  Plan no more routine CAT scans.    *On 1/23/18 visit: Left leg swelling times 6-9 months.  I recommended she have a left leg Doppler today and if positive receive Lovenox daily through our office while awaiting a therapeutic INR on Coumadin.  I told her an untreated DVT could be life-threatening due to subsequent pulmonary embolism.  She understands this but absolutely refuses workup or treatment of this.  She does not like coming to doctor's appointments.  She doesn't want any more visits or trips in.    PLAN:  · Begin Velcade weekly 3 out of 4 weeks today, 12/20/18 with Decadron 20 mg weekly  · When renal dysfunction improves, may change to Revlimid and Decadron both for transportation issues and to decrease risk of neuropathy.    · 1 L normal saline today and in one week  · Monthly Xgeva, second dose today.  · Stat CMP weekly for now  · Check SPEP, SIFE, serum free light chains and iron labs every 1-2 months    She is beginning drug therapy requiring extensive monitoring for toxicity.    Send this note to Dr. Federico Cagle

## 2018-12-20 NOTE — PROGRESS NOTES
Subjective     PATIENT NAME:  Raina Jackson  YOB: 1929  PATIENTS AGE:  89 y.o.  PATIENTS SEX:  female  DATE OF SERVICE:  12/20/2018  PROVIDER:  DELORES Richardson      ____________________PATIENT EDUCATION____________________    PATIENT EDUCATION:  Today I met with the patient to discuss the chemotherapy regimen recommended for treatment of her disease.  The patient was given explanation of treatment premed side effects including office policy that prohibits patients to drive if sedating medications are administered, MD explanation given regarding benefits, side effects, toxicities and goals of treatment.  The patient received a Chemotherapy/Biotherapy Plan Summary including diagnosis and specific treatment plan.    SIDE EFFECTS:  Common side effects were discussed with the patient and her son.  Discussion included hair loss/discoloration, anemia/fatigue, infection/chills/fever, appetite, bleeding risk/precautions, constipation, diarrhea, mouth sores, taste alteration, loss of appetite,nausea/vomiting, peripheral neuropathy, skin/nail changes, rash, muscle aches/weakness, photosensitivity, weight gain/loss, hearing loss, dizziness, menopausal symptoms, menstrrual irregularity, sterility, high blood pressure, heart damage, liver damage, lung damage, kidney damage, DVT/PE risk, fluid retention, pleural/pericardial effusion, somnolence, electrolyte/LFT imbalance,     Discussion also included side effects specific to drugs in the treatment plan, Velcade and Decadron specifically.      A total of 45  minutes were spent with the patient, with 100% of time spent in education and counseling.

## 2018-12-21 NOTE — TELEPHONE ENCOUNTER
Pt and son calling w/ questions about chemo and decadron. Pt started velcade injections late yesterday afternoon and was told to take their decadron this morning so that it wouldn't interfere w/ her sleep. They are questioning whether they should take it the day of treatment or the day after. They also asked if this is to be taken only on treatment weeks or every week. Instructed them that she should take decadron w/ breakfast the morning of treatment, but that if her appt was late in the day it would be fine to take the day after as they were previously told. Also told them that the MD note indicates decadron daily but that they may clarify this w/ the NP at their appt next week. They also had questions regarding Zofran and acyclovir and when these should be taken. Instructed them that Zofran is for nausea and can be taken as needed and that Acyclovir should be taken twice daily per the prescribing instructions, they v/u.    ----- Message from Bernadette Yusuf sent at 12/21/2018 10:38 AM EST -----  Contact: [phone]  [place]  Questions about chemo drugs

## 2018-12-28 NOTE — PROGRESS NOTES
Subjective .     REASONS FOR FOLLOWUP:  multiple myeloma, anemia    HISTORY OF PRESENT ILLNESS:  The patient is a 89 y.o. year old female with the above-mentioned history who is here today for follow-up.  She reports that she is drinking 2-3 bottles of water per day.  She complains of itching on her legs from her knees down.  She states it is more prominent at night.  She does put a moisturizer on her.  She denies any rash.  She reports that she is sleeping well.  She has a good appetite.  She denies issues with bowel function.  After her Velcade last week she states that the following day she felt a lot more fatigued and shaky.    Past Medical History:   Diagnosis Date   • Anemia     Secondary to chronic renal insufficiency and folate insufficiency   • Cataract    • Cystic mass of pancreatic head     On CT of 01/16/2009. 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   • Osteoporosis    • PICC (peripherally inserted central catheter) removal     Secondary to DVT   • Renal insufficiency     Chronic   • Small bowel perforation (CMS/HCC) 07/2010    Small bowel and cecal perforation with abcess formation and infected mesh from prior hernia repair   • Temporal arteritis (CMS/HCC) 11/10/2005   • Torn rotator cuff 2007     Past Surgical History:   Procedure Laterality Date   • BLADDER REPAIR  1995   • CATARACT EXTRACTION  2009   • COLONOSCOPY  1997   • COLONOSCOPY  2009   • COLOSTOMY  01/2009    Patient had a ruptured sigmoid diverticulum which led to a diverting colostomy   • COLOSTOMY CLOSURE     • CORRECTION HAMMER TOE   1995   • ENDOSCOPY     • EYE SURGERY  04/2007    Cataract   • GLAUCOMA SURGERY  2004   • HERNIA REPAIR  04/26/2010    Ventral hernia repair from prior sigmoid resection   • HYSTERECTOMY  1995   • INCISIONAL HERNIA REPAIR  2010    Dr. Mcgee   • JOINT REPLACEMENT  2002    Bilateral knee replacements       HEMATOLOGIC/ONCOLOGIC HISTORY:  (History from previous dates can be found in the separate document.)    MEDICATIONS    Current Outpatient Medications:   •  acetaminophen (TYLENOL) 500 MG tablet, Take 1 tablet by mouth Every 6 (Six) Hours As Needed for mild pain (1-3)., Disp: 30 tablet, Rfl: 0  •  acyclovir (ZOVIRAX) 400 MG tablet, Take 0.5 tablets by mouth 2 (Two) Times a Day., Disp: 60 tablet, Rfl: 7  •  amLODIPine (NORVASC) 5 MG tablet, TAKE 1 TABLET BY MOUTH ONE TIME A DAY , Disp: 90 tablet, Rfl: 0  •  B Complex Vitamins (VITAMIN B COMPLEX) capsule capsule, Take 1 capsule by mouth Daily., Disp: , Rfl:   •  bumetanide (BUMEX) 1 MG tablet, TAKE 1 TABLET BY MOUTH ONE TIME A DAY , Disp: 90 tablet, Rfl: 0  •  calcium-vitamin D (OSCAL-500) 500-200 MG-UNIT per tablet, Take 1 tablet by mouth., Disp: , Rfl:   •  cephalexin (KEFLEX) 500 MG capsule, Take 1 capsule by mouth 2 (Two) Times a Day., Disp: 14 capsule, Rfl: 0  •  cholecalciferol (VITAMIN D3) 1000 UNITS tablet, Take 1,000 Units by mouth Daily., Disp: , Rfl:   •  dexamethasone (DECADRON) 4 MG tablet, Take 5 tablets by mouth Daily With Breakfast. Take with food on Days 1, 8 and 15., Disp: 15 tablet, Rfl: 3  •  HYDROcodone-acetaminophen (NORCO) 7.5-325 MG per tablet, Take 1 tablet by mouth Every 4 (Four) Hours As Needed for Moderate Pain ., Disp: 24 tablet, Rfl: 0  •  levothyroxine (SYNTHROID, LEVOTHROID) 75 MCG tablet, Take 1 tablet by mouth Daily., Disp: 90 tablet, Rfl: 1  •  levothyroxine (SYNTHROID, LEVOTHROID) 75 MCG tablet, TAKE 1 TABLET BY MOUTH ONE TIME A DAY , Disp: 90 tablet, Rfl: 0  •  MethylPREDNISolone (MEDROL, NILA,) 4 MG tablet, Take as directed on  package instructions., Disp: 21 each, Rfl: 0  •  Multiple Vitamins-Calcium (ONE-A-DAY WOMENS PO), Take  by mouth., Disp: , Rfl:   •  mupirocin (BACTROBAN) 2 % nasal ointment, Apply topically twice a day to right leg wound, Disp: 15 g, Rfl: 2  •  ondansetron (ZOFRAN) 8 MG tablet, Take 1 tablet by mouth 3 (Three) Times a Day As Needed for Nausea or Vomiting., Disp: 30 tablet, Rfl: 5  •  predniSONE (DELTASONE) 10 MG tablet, 2 by mouth twice a day for 4 days, then one twice a day for 4 days, then 1 every morning until taken, Disp: 28 tablet, Rfl: 0    ALLERGIES:     Allergies   Allergen Reactions   • Sulfa Antibiotics        SOCIAL HISTORY:       Social History     Socioeconomic History   • Marital status:      Spouse name: Not on file   • Number of children: Not on file   • Years of education: Not on file   • Highest education level: Not on file   Social Needs   • Financial resource strain: Not on file   • Food insecurity - worry: Not on file   • Food insecurity - inability: Not on file   • Transportation needs - medical: Not on file   • Transportation needs - non-medical: Not on file   Occupational History     Employer: [employer]     Employer: RETIRED   Tobacco Use   • Smoking status: Former Smoker   • Smokeless tobacco: Never Used   Substance and Sexual Activity   • Alcohol use: No   • Drug use: No   • Sexual activity: Not on file   Other Topics Concern   • Not on file   Social History Narrative    Spouse  from what sounds like cholangiocarcinoma.         FAMILY HISTORY:  Family History   Problem Relation Age of Onset   • Cancer Neg Hx        REVIEW OF SYSTEMS:  Review of Systems   Constitutional: Positive for fatigue. Negative for activity change.   HENT: Negative for nosebleeds and trouble swallowing.    Respiratory: Negative for shortness of breath and wheezing.    Cardiovascular: Positive for leg swelling. Negative for chest pain and palpitations.   Gastrointestinal: Negative for  constipation, diarrhea and nausea.   Genitourinary: Negative for dysuria and hematuria.   Musculoskeletal: Negative for arthralgias and myalgias.   Skin: Negative for rash and wound.        See HPI   Neurological: Negative for seizures and syncope.   Hematological: Negative for adenopathy. Does not bruise/bleed easily.   Psychiatric/Behavioral: Negative for confusion.    12/28/2018 review of systems unchanged from above except as noted.       Objective    Vitals:    12/28/18 0922   BP: 155/66   Pulse: 83   Resp: 18   Temp: 98.9 °F (37.2 °C)   SpO2: 97%   Weight: 44.3 kg (97 lb 9.6 oz)   PainSc: 0-No pain     Current Status 12/28/2018   ECOG score 0      Physical Exam   Constitutional: She is oriented to person, place, and time. She appears well-developed and well-nourished. No distress.   HENT:   Head: Normocephalic and atraumatic.   Mouth/Throat: Oropharynx is clear and moist and mucous membranes are normal. No oropharyngeal exudate.   Eyes: Pupils are equal, round, and reactive to light.   Neck: Normal range of motion.   Cardiovascular: Normal rate, regular rhythm and normal heart sounds.   No murmur heard.  Pulmonary/Chest: Effort normal and breath sounds normal. No respiratory distress. She has no wheezes. She has no rhonchi. She has no rales.   Abdominal: Soft. Normal appearance and bowel sounds are normal. She exhibits no distension. There is no hepatosplenomegaly.   Musculoskeletal: Normal range of motion. She exhibits edema (trace bilateral ankle edema left slightly greater than right).   Neurological: She is alert and oriented to person, place, and time.   Skin: Skin is warm and dry. No rash noted.   Right shin wound from prior fall about 2 cm in size.  There is no signs of infection.   Psychiatric: She has a normal mood and affect.   Vitals reviewed.    RECENT LABS:        WBC   Date/Time Value Ref Range Status   12/28/2018 08:54 AM 4.75 4.00 - 10.00 10*3/mm3 Final     Hemoglobin   Date/Time Value Ref  Range Status   12/28/2018 08:54 AM 9.4 (L) 11.5 - 14.9 g/dL Final     Platelets   Date/Time Value Ref Range Status   12/28/2018 08:54  150 - 375 10*3/mm3 Final     Lab Results   Component Value Date    GLUCOSE 94 12/28/2018    BUN 49 (H) 12/28/2018    CREATININE 2.63 (C) 12/28/2018    EGFRIFNONA 17 (L) 12/28/2018    EGFRIFAFRI 30 (L) 04/23/2018    BCR 18.6 12/28/2018    K 4.0 12/28/2018    CO2 17.8 (L) 12/28/2018    CALCIUM 8.2 (L) 12/28/2018    PROTENTOTREF 6.5 11/19/2018    ALBUMIN 3.80 12/28/2018    LABIL2 1.4 11/19/2018    AST 29 12/28/2018    ALT 26 12/28/2018       Assessment/Plan     ASSESSMENT:  *IgG kappa multiple myeloma.  (Bone marrow 3/31/11 revealed 10% monoclonal IgG kappa plasma cells.  This was smoldering myeloma until she became symptomatic from a calcium of 11.6 on 11/19/18)  Main issue is anemia was anemia which was successfully treated with Procrit.  However, developed a calcium 11.6 on 11/19/18 and creatinine rise up to 3 on 12/20/18.  PET 12/11/18 negative for myeloma bone lesions  · Serum M spike stable at 0.8.  · Kappa/lambda ratio in urine 111 (no prior for comparison).  We do not have a recent serum kappa/lambda ratio.  I have added this to today's labs.  I discussed this case with Dr. Danielle from our practice and he agrees with my assessment of this now being multiple myeloma and with the need to begin treatment.  Also discussed this with Dr. Federico Cagle, her nephrologist.  He agrees the kidney dysfunction is likely from multiple myeloma.  Typically, IUs Revlimid 15 mg daily D1-21/28 days with Decadron 20 mg weekly for elderly patients.  However, with her renal dysfunction this would need to be dose reduced, likely to 2.5 mg daily D1-21/28 days.  With renal dysfunction, leukocytopenia can become more problematic from Revlimid.  Therefore, generally Revlimid is avoided with acute renal insufficiency.  Therefore, I have recommended Velcade weekly 3 out of 4 weeks with Decadron 20 mg  weekly.  Patient and sons want to proceed with therapy.  They understand this is not curable.  Transportation is problematic and therefore weekly Velcade is not a good long-term solution for her.  Furthermore, she is frail with limited mobility and has bilateral finger numbness, suspected to be due to cervical disc disease.  I think significant peripheral neuropathy from Velcade would be quite problematic for her quality of life.  My plan is to hopefully improve creatinine with Velcade and then switch to Revlimid and Decadron.    *Hypercalcemia.  Calcium highest value 11.6 on 11/19/18.  · Xgeva, monthly, dose 1 on 11/21/18.  · Calcium normal currently.  Continue Xgeva monthly.    *Iron deficiency anemia.  (In the past, difficulty tolerating by mouth iron).  However, has been on iron 3 times per week since the fall 2012.  Hemoglobin is stable.  History of Feraheme  Last iron labs November 2018, unremarkable.    *Anemia of chronic renal insufficiency, stage III.  She likes to minimize Procrit.  Has not received Procrit for quite some time.  Sees Dr. Federico Cagle.    *Acute kidney injury on chronic kidney disease, stage III.  Creatinine gradually worsening.  From 1.4-1.5, up to 1.8-1.9.  Creatinine carlitos to 3 on 12/20/18, prompting an urgent start of Velcade and Decadron.  12/20/2018 creatinine improved to 2.63.    *PICC associated DVT August 2010.  Completed 6 months Coumadin 3/20/11.  (If treatment for myeloma is given, will need to keep this in mind, since she had a clot in the past).    *Leg wound after a fall.  Occurred around late November 2018.  Wound is healing.  Patient and sons understand neutropenia from chemotherapy can delay healing and increase the risk of infection.    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  Completed 2 years of CAT scan follow-up 02/02/11.  Plan no more routine CAT scans.    *On 1/23/18 visit: Left leg swelling times 6-9 months.  I recommended she have a left leg Doppler today  and if positive receive Lovenox daily through our office while awaiting a therapeutic INR on Coumadin.  I told her an untreated DVT could be life-threatening due to subsequent pulmonary embolism.  She understands this but absolutely refuses workup or treatment of this.  She does not like coming to doctor's appointments.  She doesn't want any more visits or trips in.    PLAN:  · 1 L normal saline today.  · Velcade today (weekly 3 out of 4 weeks started 12/20/18).  · Continue Decadron 20 mg weekly.  · Return for follow-up visit in 1 week for reevaluation by nurse practitioner and possible IV fluids.  · When renal dysfunction improves, may change to Revlimid and Decadron both for transportation issues and to decrease risk of neuropathy.    · Monthly Xgeva, (last October 2018).    · Stat CMP weekly for now  · Check SPEP, SIFE, serum free light chains and iron labs every 1-2 months

## 2019-01-01 ENCOUNTER — INFUSION (OUTPATIENT)
Dept: ONCOLOGY | Facility: HOSPITAL | Age: 84
End: 2019-01-01

## 2019-01-01 ENCOUNTER — APPOINTMENT (OUTPATIENT)
Dept: ONCOLOGY | Facility: HOSPITAL | Age: 84
End: 2019-01-01

## 2019-01-01 ENCOUNTER — LAB (OUTPATIENT)
Dept: LAB | Facility: HOSPITAL | Age: 84
End: 2019-01-01

## 2019-01-01 ENCOUNTER — TELEPHONE (OUTPATIENT)
Dept: ONCOLOGY | Facility: CLINIC | Age: 84
End: 2019-01-01

## 2019-01-01 ENCOUNTER — TELEPHONE (OUTPATIENT)
Dept: ONCOLOGY | Facility: HOSPITAL | Age: 84
End: 2019-01-01

## 2019-01-01 ENCOUNTER — OFFICE VISIT (OUTPATIENT)
Dept: ONCOLOGY | Facility: CLINIC | Age: 84
End: 2019-01-01

## 2019-01-01 ENCOUNTER — HOSPITAL ENCOUNTER (INPATIENT)
Facility: HOSPITAL | Age: 84
LOS: 3 days | End: 2019-01-28
Attending: INTERNAL MEDICINE | Admitting: INTERNAL MEDICINE

## 2019-01-01 ENCOUNTER — APPOINTMENT (OUTPATIENT)
Dept: OTHER | Facility: HOSPITAL | Age: 84
End: 2019-01-01

## 2019-01-01 ENCOUNTER — APPOINTMENT (OUTPATIENT)
Dept: ONCOLOGY | Facility: CLINIC | Age: 84
End: 2019-01-01

## 2019-01-01 ENCOUNTER — OFFICE VISIT (OUTPATIENT)
Dept: INTERNAL MEDICINE | Facility: CLINIC | Age: 84
End: 2019-01-01

## 2019-01-01 VITALS — DIASTOLIC BLOOD PRESSURE: 81 MMHG | SYSTOLIC BLOOD PRESSURE: 169 MMHG

## 2019-01-01 VITALS
HEART RATE: 84 BPM | HEIGHT: 60 IN | BODY MASS INDEX: 19.81 KG/M2 | OXYGEN SATURATION: 100 % | SYSTOLIC BLOOD PRESSURE: 132 MMHG | TEMPERATURE: 97.5 F | DIASTOLIC BLOOD PRESSURE: 70 MMHG | RESPIRATION RATE: 16 BRPM | WEIGHT: 100.9 LBS

## 2019-01-01 VITALS
SYSTOLIC BLOOD PRESSURE: 155 MMHG | OXYGEN SATURATION: 96 % | HEART RATE: 74 BPM | TEMPERATURE: 97 F | BODY MASS INDEX: 19.94 KG/M2 | HEIGHT: 60 IN | DIASTOLIC BLOOD PRESSURE: 71 MMHG | WEIGHT: 101.6 LBS

## 2019-01-01 VITALS
DIASTOLIC BLOOD PRESSURE: 58 MMHG | TEMPERATURE: 100.3 F | HEIGHT: 60 IN | SYSTOLIC BLOOD PRESSURE: 118 MMHG | OXYGEN SATURATION: 87 % | WEIGHT: 100 LBS | BODY MASS INDEX: 19.63 KG/M2

## 2019-01-01 VITALS
TEMPERATURE: 98.6 F | HEART RATE: 93 BPM | WEIGHT: 100 LBS | DIASTOLIC BLOOD PRESSURE: 71 MMHG | SYSTOLIC BLOOD PRESSURE: 172 MMHG | BODY MASS INDEX: 19.53 KG/M2

## 2019-01-01 VITALS
WEIGHT: 100 LBS | HEART RATE: 78 BPM | OXYGEN SATURATION: 98 % | SYSTOLIC BLOOD PRESSURE: 178 MMHG | HEIGHT: 60 IN | BODY MASS INDEX: 19.63 KG/M2 | DIASTOLIC BLOOD PRESSURE: 83 MMHG | RESPIRATION RATE: 18 BRPM

## 2019-01-01 VITALS
WEIGHT: 100 LBS | RESPIRATION RATE: 18 BRPM | HEART RATE: 78 BPM | OXYGEN SATURATION: 98 % | BODY MASS INDEX: 19.53 KG/M2 | DIASTOLIC BLOOD PRESSURE: 83 MMHG | SYSTOLIC BLOOD PRESSURE: 178 MMHG

## 2019-01-01 VITALS
DIASTOLIC BLOOD PRESSURE: 75 MMHG | TEMPERATURE: 98 F | RESPIRATION RATE: 16 BRPM | HEART RATE: 78 BPM | SYSTOLIC BLOOD PRESSURE: 194 MMHG | BODY MASS INDEX: 19.84 KG/M2 | OXYGEN SATURATION: 98 % | HEIGHT: 60 IN

## 2019-01-01 VITALS
HEART RATE: 69 BPM | OXYGEN SATURATION: 97 % | DIASTOLIC BLOOD PRESSURE: 86 MMHG | RESPIRATION RATE: 16 BRPM | TEMPERATURE: 97.1 F | SYSTOLIC BLOOD PRESSURE: 193 MMHG

## 2019-01-01 DIAGNOSIS — E83.51 HYPOCALCEMIA: ICD-10-CM

## 2019-01-01 DIAGNOSIS — D50.9 IRON DEFICIENCY ANEMIA, UNSPECIFIED IRON DEFICIENCY ANEMIA TYPE: ICD-10-CM

## 2019-01-01 DIAGNOSIS — E83.52 HYPERCALCEMIA: ICD-10-CM

## 2019-01-01 DIAGNOSIS — C90.00 MULTIPLE MYELOMA NOT HAVING ACHIEVED REMISSION (HCC): ICD-10-CM

## 2019-01-01 DIAGNOSIS — D47.2 SMOLDERING MULTIPLE MYELOMA (SMM): ICD-10-CM

## 2019-01-01 DIAGNOSIS — E86.0 DEHYDRATION: ICD-10-CM

## 2019-01-01 DIAGNOSIS — T14.8XXA ABRASION: ICD-10-CM

## 2019-01-01 DIAGNOSIS — N18.30 STAGE 3 CHRONIC KIDNEY DISEASE (HCC): ICD-10-CM

## 2019-01-01 DIAGNOSIS — N18.30 STAGE 3 CHRONIC KIDNEY DISEASE (HCC): Primary | ICD-10-CM

## 2019-01-01 DIAGNOSIS — C90.00 MULTIPLE MYELOMA NOT HAVING ACHIEVED REMISSION (HCC): Primary | ICD-10-CM

## 2019-01-01 DIAGNOSIS — N17.9 ACUTE RENAL FAILURE, UNSPECIFIED ACUTE RENAL FAILURE TYPE (HCC): ICD-10-CM

## 2019-01-01 DIAGNOSIS — D47.2 SMOLDERING MULTIPLE MYELOMA (SMM): Primary | ICD-10-CM

## 2019-01-01 DIAGNOSIS — E43 SEVERE MALNUTRITION (HCC): ICD-10-CM

## 2019-01-01 DIAGNOSIS — L24.A9 WOUND DRAINAGE: ICD-10-CM

## 2019-01-01 LAB
ABO + RH BLD: NORMAL
ABO + RH BLD: NORMAL
ABO GROUP BLD: NORMAL
ABO GROUP BLD: NORMAL
ALBUMIN SERPL-MCNC: 2.9 G/DL (ref 2.9–4.4)
ALBUMIN SERPL-MCNC: 3.1 G/DL (ref 3.5–5.2)
ALBUMIN SERPL-MCNC: 3.3 G/DL (ref 3.5–5.2)
ALBUMIN SERPL-MCNC: 3.3 G/DL (ref 3.5–5.2)
ALBUMIN SERPL-MCNC: 3.6 G/DL (ref 3.5–5.2)
ALBUMIN/GLOB SERPL: 1.4 G/DL
ALBUMIN/GLOB SERPL: 1.4 G/DL (ref 1.1–2.4)
ALBUMIN/GLOB SERPL: 1.5 {RATIO} (ref 0.7–1.7)
ALBUMIN/GLOB SERPL: 1.6 G/DL
ALBUMIN/GLOB SERPL: 1.6 G/DL (ref 1.1–2.4)
ALP SERPL-CCNC: 55 U/L (ref 38–116)
ALP SERPL-CCNC: 55 U/L (ref 38–116)
ALP SERPL-CCNC: 55 U/L (ref 39–117)
ALP SERPL-CCNC: 74 U/L (ref 39–117)
ALPHA1 GLOB FLD ELPH-MCNC: 0.3 G/DL (ref 0–0.4)
ALPHA2 GLOB SERPL ELPH-MCNC: 0.5 G/DL (ref 0.4–1)
ALT SERPL W P-5'-P-CCNC: 20 U/L (ref 0–33)
ALT SERPL W P-5'-P-CCNC: 21 U/L (ref 1–33)
ALT SERPL W P-5'-P-CCNC: 22 U/L (ref 0–33)
ALT SERPL W P-5'-P-CCNC: 27 U/L (ref 1–33)
ANION GAP SERPL CALCULATED.3IONS-SCNC: 11.4 MMOL/L
ANION GAP SERPL CALCULATED.3IONS-SCNC: 12.1 MMOL/L
ANION GAP SERPL CALCULATED.3IONS-SCNC: 13.8 MMOL/L
ANION GAP SERPL CALCULATED.3IONS-SCNC: 14.3 MMOL/L
AST SERPL-CCNC: 25 U/L (ref 0–32)
AST SERPL-CCNC: 25 U/L (ref 0–32)
AST SERPL-CCNC: 25 U/L (ref 1–32)
AST SERPL-CCNC: 30 U/L (ref 1–32)
B-GLOBULIN SERPL ELPH-MCNC: 0.7 G/DL (ref 0.7–1.3)
BASOPHILS # BLD AUTO: 0 10*3/MM3 (ref 0–0.1)
BASOPHILS # BLD AUTO: 0.01 10*3/MM3 (ref 0–0.1)
BASOPHILS # BLD AUTO: 0.01 10*3/MM3 (ref 0–0.2)
BASOPHILS # BLD AUTO: 0.03 10*3/MM3 (ref 0–0.2)
BASOPHILS NFR BLD AUTO: 0 % (ref 0–1.1)
BASOPHILS NFR BLD AUTO: 0.2 % (ref 0–1.1)
BASOPHILS NFR BLD AUTO: 0.2 % (ref 0–1.5)
BASOPHILS NFR BLD AUTO: 0.6 % (ref 0–1.5)
BH BB BLOOD EXPIRATION DATE: NORMAL
BH BB BLOOD EXPIRATION DATE: NORMAL
BH BB BLOOD TYPE BARCODE: 5100
BH BB BLOOD TYPE BARCODE: 5100
BH BB DISPENSE STATUS: NORMAL
BH BB DISPENSE STATUS: NORMAL
BH BB PRODUCT CODE: NORMAL
BH BB PRODUCT CODE: NORMAL
BH BB UNIT NUMBER: NORMAL
BH BB UNIT NUMBER: NORMAL
BILIRUB SERPL-MCNC: 0.2 MG/DL (ref 0.1–1.2)
BILIRUB SERPL-MCNC: 0.4 MG/DL (ref 0.1–1.2)
BLD GP AB SCN SERPL QL: NEGATIVE
BUN BLD-MCNC: 51 MG/DL (ref 6–20)
BUN BLD-MCNC: 57 MG/DL (ref 6–20)
BUN BLD-MCNC: 57 MG/DL (ref 8–23)
BUN BLD-MCNC: 64 MG/DL (ref 8–23)
BUN/CREAT SERPL: 15.9 (ref 7–25)
BUN/CREAT SERPL: 17 (ref 7.3–30)
BUN/CREAT SERPL: 17.5 (ref 7–25)
BUN/CREAT SERPL: 18.9 (ref 7.3–30)
CALCIUM SPEC-SCNC: 7.2 MG/DL (ref 8.5–10.2)
CALCIUM SPEC-SCNC: 8.2 MG/DL (ref 8.5–10.2)
CALCIUM SPEC-SCNC: 8.7 MG/DL (ref 8.6–10.5)
CALCIUM SPEC-SCNC: 9.2 MG/DL (ref 8.6–10.5)
CHLORIDE SERPL-SCNC: 114 MMOL/L (ref 98–107)
CHLORIDE SERPL-SCNC: 116 MMOL/L (ref 98–107)
CHLORIDE SERPL-SCNC: 116 MMOL/L (ref 98–107)
CHLORIDE SERPL-SCNC: 119 MMOL/L (ref 98–107)
CO2 SERPL-SCNC: 14.2 MMOL/L (ref 22–29)
CO2 SERPL-SCNC: 15.6 MMOL/L (ref 22–29)
CO2 SERPL-SCNC: 15.7 MMOL/L (ref 22–29)
CO2 SERPL-SCNC: 18.9 MMOL/L (ref 22–29)
CREAT BLD-MCNC: 3 MG/DL (ref 0.6–1.1)
CREAT BLD-MCNC: 3.01 MG/DL (ref 0.6–1.1)
CREAT BLD-MCNC: 3.25 MG/DL (ref 0.57–1)
CREAT BLD-MCNC: 4.03 MG/DL (ref 0.57–1)
CROSSMATCH INTERPRETATION: NORMAL
CROSSMATCH INTERPRETATION: NORMAL
DEPRECATED RDW RBC AUTO: 61.3 FL (ref 37–49)
DEPRECATED RDW RBC AUTO: 63.1 FL (ref 37–54)
DEPRECATED RDW RBC AUTO: 64.9 FL (ref 37–54)
DEPRECATED RDW RBC AUTO: 66.4 FL (ref 37–49)
EOSINOPHIL # BLD AUTO: 0.02 10*3/MM3 (ref 0–0.36)
EOSINOPHIL # BLD AUTO: 0.02 10*3/MM3 (ref 0–0.36)
EOSINOPHIL # BLD AUTO: 0.02 10*3/MM3 (ref 0–0.7)
EOSINOPHIL # BLD AUTO: 0.04 10*3/MM3 (ref 0–0.7)
EOSINOPHIL NFR BLD AUTO: 0.4 % (ref 0.3–6.2)
EOSINOPHIL NFR BLD AUTO: 0.4 % (ref 1–5)
EOSINOPHIL NFR BLD AUTO: 0.5 % (ref 1–5)
EOSINOPHIL NFR BLD AUTO: 0.9 % (ref 0.3–6.2)
ERYTHROCYTE [DISTWIDTH] IN BLOOD BY AUTOMATED COUNT: 17.8 % (ref 11.7–13)
ERYTHROCYTE [DISTWIDTH] IN BLOOD BY AUTOMATED COUNT: 17.8 % (ref 11.7–14.5)
ERYTHROCYTE [DISTWIDTH] IN BLOOD BY AUTOMATED COUNT: 18.6 % (ref 11.7–14.5)
ERYTHROCYTE [DISTWIDTH] IN BLOOD BY AUTOMATED COUNT: 19 % (ref 11.7–13)
FERRITIN SERPL-MCNC: 113.4 NG/ML (ref 13–150)
GAMMA GLOB SERPL ELPH-MCNC: 0.5 G/DL (ref 0.4–1.8)
GFR SERPL CREATININE-BSD FRML MDRD: 10 ML/MIN/1.73
GFR SERPL CREATININE-BSD FRML MDRD: 13 ML/MIN/1.73
GFR SERPL CREATININE-BSD FRML MDRD: 15 ML/MIN/1.73
GFR SERPL CREATININE-BSD FRML MDRD: 15 ML/MIN/1.73
GFR SERPL CREATININE-BSD FRML MDRD: ABNORMAL ML/MIN/1.73
GFR SERPL CREATININE-BSD FRML MDRD: ABNORMAL ML/MIN/1.73
GLOBULIN SER CALC-MCNC: 1.9 G/DL (ref 2.2–3.9)
GLOBULIN UR ELPH-MCNC: 1.9 GM/DL
GLOBULIN UR ELPH-MCNC: 2.3 GM/DL (ref 1.8–3.5)
GLOBULIN UR ELPH-MCNC: 2.4 GM/DL
GLOBULIN UR ELPH-MCNC: 2.4 GM/DL (ref 1.8–3.5)
GLUCOSE BLD-MCNC: 102 MG/DL (ref 65–99)
GLUCOSE BLD-MCNC: 78 MG/DL (ref 74–124)
GLUCOSE BLD-MCNC: 83 MG/DL (ref 74–124)
GLUCOSE BLD-MCNC: 92 MG/DL (ref 65–99)
HCT VFR BLD AUTO: 25.4 % (ref 35.6–45.5)
HCT VFR BLD AUTO: 29.9 % (ref 34–45)
HCT VFR BLD AUTO: 30 % (ref 34–45)
HCT VFR BLD AUTO: 37.7 % (ref 35.6–45.5)
HGB BLD-MCNC: 12 G/DL (ref 11.9–15.5)
HGB BLD-MCNC: 8 G/DL (ref 11.9–15.5)
HGB BLD-MCNC: 9.3 G/DL (ref 11.5–14.9)
HGB BLD-MCNC: 9.5 G/DL (ref 11.5–14.9)
IGA SERPL-MCNC: 12 MG/DL (ref 64–422)
IGG SERPL-MCNC: 590 MG/DL (ref 700–1600)
IGM SERPL-MCNC: 12 MG/DL (ref 26–217)
IMM GRANULOCYTES # BLD AUTO: 0.02 10*3/MM3 (ref 0–0.03)
IMM GRANULOCYTES # BLD AUTO: 0.02 10*3/MM3 (ref 0–0.03)
IMM GRANULOCYTES # BLD AUTO: 0.03 10*3/MM3 (ref 0–0.03)
IMM GRANULOCYTES # BLD AUTO: 0.04 10*3/MM3 (ref 0–0.03)
IMM GRANULOCYTES NFR BLD AUTO: 0.4 % (ref 0–0.5)
IMM GRANULOCYTES NFR BLD AUTO: 0.5 % (ref 0–0.5)
IMM GRANULOCYTES NFR BLD AUTO: 0.7 % (ref 0–0.5)
IMM GRANULOCYTES NFR BLD AUTO: 0.8 % (ref 0–0.5)
IRON 24H UR-MRATE: 52 MCG/DL (ref 37–145)
IRON SATN MFR SERPL: 22 % (ref 20–50)
KAPPA LC SERPL-MCNC: 121.1 MG/L (ref 3.3–19.4)
KAPPA LC/LAMBDA SER: 12.61 {RATIO} (ref 0.26–1.65)
LAMBDA LC FREE SERPL-MCNC: 9.6 MG/L (ref 5.7–26.3)
LYMPHOCYTES # BLD AUTO: 0.28 10*3/MM3 (ref 1–3.5)
LYMPHOCYTES # BLD AUTO: 0.39 10*3/MM3 (ref 0.9–4.8)
LYMPHOCYTES # BLD AUTO: 0.43 10*3/MM3 (ref 0.9–4.8)
LYMPHOCYTES # BLD AUTO: 0.55 10*3/MM3 (ref 1–3.5)
LYMPHOCYTES NFR BLD AUTO: 13.1 % (ref 20–49)
LYMPHOCYTES NFR BLD AUTO: 5.9 % (ref 20–49)
LYMPHOCYTES NFR BLD AUTO: 7.3 % (ref 19.6–45.3)
LYMPHOCYTES NFR BLD AUTO: 9.6 % (ref 19.6–45.3)
Lab: ABNORMAL
M-SPIKE: 0.4 G/DL
MAGNESIUM SERPL-MCNC: 1.6 MG/DL (ref 1.6–2.4)
MCH RBC QN AUTO: 29.6 PG (ref 26.9–32)
MCH RBC QN AUTO: 29.9 PG (ref 27–33)
MCH RBC QN AUTO: 30 PG (ref 27–33)
MCH RBC QN AUTO: 30.5 PG (ref 26.9–32)
MCHC RBC AUTO-ENTMCNC: 31 G/DL (ref 32–35)
MCHC RBC AUTO-ENTMCNC: 31.5 G/DL (ref 32.4–36.3)
MCHC RBC AUTO-ENTMCNC: 31.8 G/DL (ref 32.4–36.3)
MCHC RBC AUTO-ENTMCNC: 31.8 G/DL (ref 32–35)
MCV RBC AUTO: 94 FL (ref 83–97)
MCV RBC AUTO: 94.1 FL (ref 80.5–98.2)
MCV RBC AUTO: 95.7 FL (ref 80.5–98.2)
MCV RBC AUTO: 96.8 FL (ref 83–97)
MONOCYTES # BLD AUTO: 0.2 10*3/MM3 (ref 0.2–1.2)
MONOCYTES # BLD AUTO: 0.2 10*3/MM3 (ref 0.2–1.2)
MONOCYTES # BLD AUTO: 0.28 10*3/MM3 (ref 0.25–0.8)
MONOCYTES # BLD AUTO: 0.35 10*3/MM3 (ref 0.25–0.8)
MONOCYTES NFR BLD AUTO: 3.7 % (ref 5–12)
MONOCYTES NFR BLD AUTO: 4.5 % (ref 5–12)
MONOCYTES NFR BLD AUTO: 5.9 % (ref 4–12)
MONOCYTES NFR BLD AUTO: 8.3 % (ref 4–12)
NEUTROPHILS # BLD AUTO: 3.26 10*3/MM3 (ref 1.5–7)
NEUTROPHILS # BLD AUTO: 3.76 10*3/MM3 (ref 1.9–8.1)
NEUTROPHILS # BLD AUTO: 4.14 10*3/MM3 (ref 1.5–7)
NEUTROPHILS # BLD AUTO: 4.71 10*3/MM3 (ref 1.9–8.1)
NEUTROPHILS NFR BLD AUTO: 77.4 % (ref 39–75)
NEUTROPHILS NFR BLD AUTO: 84.1 % (ref 42.7–76)
NEUTROPHILS NFR BLD AUTO: 87 % (ref 39–75)
NEUTROPHILS NFR BLD AUTO: 87.6 % (ref 42.7–76)
NRBC BLD AUTO-RTO: 0 /100 WBC (ref 0–0)
PHOSPHATE SERPL-MCNC: 5.5 MG/DL (ref 2.5–4.5)
PLATELET # BLD AUTO: 105 10*3/MM3 (ref 140–500)
PLATELET # BLD AUTO: 117 10*3/MM3 (ref 150–375)
PLATELET # BLD AUTO: 135 10*3/MM3 (ref 140–500)
PLATELET # BLD AUTO: 92 10*3/MM3 (ref 150–375)
PMV BLD AUTO: 10.8 FL (ref 6–12)
PMV BLD AUTO: 10.9 FL (ref 8.9–12.1)
PMV BLD AUTO: 11.6 FL (ref 8.9–12.1)
PMV BLD AUTO: 9.5 FL (ref 6–12)
POTASSIUM BLD-SCNC: 3.8 MMOL/L (ref 3.5–4.7)
POTASSIUM BLD-SCNC: 4.3 MMOL/L (ref 3.5–4.7)
POTASSIUM BLD-SCNC: 4.3 MMOL/L (ref 3.5–5.2)
POTASSIUM BLD-SCNC: 4.6 MMOL/L (ref 3.5–5.2)
PROT PATTERN SERPL IFE-IMP: ABNORMAL
PROT SERPL-MCNC: 4.8 G/DL (ref 6–8.5)
PROT SERPL-MCNC: 5 G/DL (ref 6–8.5)
PROT SERPL-MCNC: 5.7 G/DL (ref 6.3–8)
PROT SERPL-MCNC: 5.7 G/DL (ref 6–8.5)
PROT SERPL-MCNC: 5.9 G/DL (ref 6.3–8)
RBC # BLD AUTO: 2.7 10*6/MM3 (ref 3.9–5.2)
RBC # BLD AUTO: 3.1 10*6/MM3 (ref 3.9–5)
RBC # BLD AUTO: 3.18 10*6/MM3 (ref 3.9–5)
RBC # BLD AUTO: 3.94 10*6/MM3 (ref 3.9–5.2)
RH BLD: POSITIVE
RH BLD: POSITIVE
SODIUM BLD-SCNC: 143 MMOL/L (ref 136–145)
SODIUM BLD-SCNC: 144 MMOL/L (ref 134–145)
SODIUM BLD-SCNC: 147 MMOL/L (ref 134–145)
SODIUM BLD-SCNC: 147 MMOL/L (ref 136–145)
T&S EXPIRATION DATE: NORMAL
TIBC SERPL-MCNC: 234 MCG/DL (ref 298–536)
TRANSFERRIN SERPL-MCNC: 157 MG/DL (ref 200–360)
UNIT  ABO: NORMAL
UNIT  ABO: NORMAL
UNIT  RH: NORMAL
UNIT  RH: NORMAL
WBC NRBC COR # BLD: 4.21 10*3/MM3 (ref 4–10)
WBC NRBC COR # BLD: 4.47 10*3/MM3 (ref 4.5–10.7)
WBC NRBC COR # BLD: 4.76 10*3/MM3 (ref 4–10)
WBC NRBC COR # BLD: 5.37 10*3/MM3 (ref 4.5–10.7)

## 2019-01-01 PROCEDURE — 36415 COLL VENOUS BLD VENIPUNCTURE: CPT | Performed by: INTERNAL MEDICINE

## 2019-01-01 PROCEDURE — 82784 ASSAY IGA/IGD/IGG/IGM EACH: CPT | Performed by: INTERNAL MEDICINE

## 2019-01-01 PROCEDURE — 84466 ASSAY OF TRANSFERRIN: CPT | Performed by: INTERNAL MEDICINE

## 2019-01-01 PROCEDURE — 85025 COMPLETE CBC W/AUTO DIFF WBC: CPT | Performed by: INTERNAL MEDICINE

## 2019-01-01 PROCEDURE — 99231 SBSQ HOSP IP/OBS SF/LOW 25: CPT | Performed by: INTERNAL MEDICINE

## 2019-01-01 PROCEDURE — P9016 RBC LEUKOCYTES REDUCED: HCPCS

## 2019-01-01 PROCEDURE — 36416 COLLJ CAPILLARY BLOOD SPEC: CPT | Performed by: INTERNAL MEDICINE

## 2019-01-01 PROCEDURE — 86901 BLOOD TYPING SEROLOGIC RH(D): CPT

## 2019-01-01 PROCEDURE — 99215 OFFICE O/P EST HI 40 MIN: CPT | Performed by: INTERNAL MEDICINE

## 2019-01-01 PROCEDURE — 83540 ASSAY OF IRON: CPT | Performed by: INTERNAL MEDICINE

## 2019-01-01 PROCEDURE — 36430 TRANSFUSION BLD/BLD COMPNT: CPT

## 2019-01-01 PROCEDURE — 25010000002 LORAZEPAM PER 2 MG: Performed by: INTERNAL MEDICINE

## 2019-01-01 PROCEDURE — 84165 PROTEIN E-PHORESIS SERUM: CPT | Performed by: INTERNAL MEDICINE

## 2019-01-01 PROCEDURE — 86900 BLOOD TYPING SEROLOGIC ABO: CPT

## 2019-01-01 PROCEDURE — 86850 RBC ANTIBODY SCREEN: CPT

## 2019-01-01 PROCEDURE — 99213 OFFICE O/P EST LOW 20 MIN: CPT | Performed by: NURSE PRACTITIONER

## 2019-01-01 PROCEDURE — 99232 SBSQ HOSP IP/OBS MODERATE 35: CPT | Performed by: INTERNAL MEDICINE

## 2019-01-01 PROCEDURE — 96360 HYDRATION IV INFUSION INIT: CPT | Performed by: NURSE PRACTITIONER

## 2019-01-01 PROCEDURE — 80053 COMPREHEN METABOLIC PANEL: CPT | Performed by: NURSE PRACTITIONER

## 2019-01-01 PROCEDURE — 25010000002 BORTEZOMIB PER 0.1 MG: Performed by: INTERNAL MEDICINE

## 2019-01-01 PROCEDURE — 80053 COMPREHEN METABOLIC PANEL: CPT | Performed by: INTERNAL MEDICINE

## 2019-01-01 PROCEDURE — 96401 CHEMO ANTI-NEOPL SQ/IM: CPT | Performed by: NURSE PRACTITIONER

## 2019-01-01 PROCEDURE — 25010000002 MORPHINE (PF) 10 MG/ML SOLUTION: Performed by: INTERNAL MEDICINE

## 2019-01-01 PROCEDURE — 84100 ASSAY OF PHOSPHORUS: CPT | Performed by: INTERNAL MEDICINE

## 2019-01-01 PROCEDURE — 85025 COMPLETE CBC W/AUTO DIFF WBC: CPT

## 2019-01-01 PROCEDURE — 63710000001 DIPHENHYDRAMINE PER 50 MG: Performed by: INTERNAL MEDICINE

## 2019-01-01 PROCEDURE — 82728 ASSAY OF FERRITIN: CPT | Performed by: INTERNAL MEDICINE

## 2019-01-01 PROCEDURE — 99214 OFFICE O/P EST MOD 30 MIN: CPT | Performed by: NURSE PRACTITIONER

## 2019-01-01 PROCEDURE — 99238 HOSP IP/OBS DSCHRG MGMT 30/<: CPT | Performed by: INTERNAL MEDICINE

## 2019-01-01 PROCEDURE — 96361 HYDRATE IV INFUSION ADD-ON: CPT | Performed by: NURSE PRACTITIONER

## 2019-01-01 PROCEDURE — 86334 IMMUNOFIX E-PHORESIS SERUM: CPT | Performed by: INTERNAL MEDICINE

## 2019-01-01 PROCEDURE — 96361 HYDRATE IV INFUSION ADD-ON: CPT

## 2019-01-01 PROCEDURE — 83735 ASSAY OF MAGNESIUM: CPT | Performed by: INTERNAL MEDICINE

## 2019-01-01 PROCEDURE — 86920 COMPATIBILITY TEST SPIN: CPT

## 2019-01-01 PROCEDURE — 96360 HYDRATION IV INFUSION INIT: CPT | Performed by: INTERNAL MEDICINE

## 2019-01-01 PROCEDURE — 83883 ASSAY NEPHELOMETRY NOT SPEC: CPT | Performed by: INTERNAL MEDICINE

## 2019-01-01 PROCEDURE — 96361 HYDRATE IV INFUSION ADD-ON: CPT | Performed by: INTERNAL MEDICINE

## 2019-01-01 PROCEDURE — 99223 1ST HOSP IP/OBS HIGH 75: CPT | Performed by: NURSE PRACTITIONER

## 2019-01-01 PROCEDURE — 96360 HYDRATION IV INFUSION INIT: CPT

## 2019-01-01 RX ORDER — GLYCOPYRROLATE 0.2 MG/ML
0.4 INJECTION INTRAMUSCULAR; INTRAVENOUS
Status: DISCONTINUED | OUTPATIENT
Start: 2019-01-01 | End: 2019-01-01 | Stop reason: HOSPADM

## 2019-01-01 RX ORDER — ACETAMINOPHEN 160 MG/5ML
650 SOLUTION ORAL EVERY 4 HOURS PRN
Status: DISCONTINUED | OUTPATIENT
Start: 2019-01-01 | End: 2019-01-01 | Stop reason: HOSPADM

## 2019-01-01 RX ORDER — GLYCOPYRROLATE 0.2 MG/ML
0.2 INJECTION INTRAMUSCULAR; INTRAVENOUS
Status: DISCONTINUED | OUTPATIENT
Start: 2019-01-01 | End: 2019-01-01 | Stop reason: HOSPADM

## 2019-01-01 RX ORDER — LORAZEPAM 2 MG/ML
1 CONCENTRATE ORAL
Status: DISCONTINUED | OUTPATIENT
Start: 2019-01-01 | End: 2019-01-01 | Stop reason: HOSPADM

## 2019-01-01 RX ORDER — ACETAMINOPHEN 650 MG/1
650 SUPPOSITORY RECTAL EVERY 4 HOURS PRN
Status: CANCELLED | OUTPATIENT
Start: 2019-01-01

## 2019-01-01 RX ORDER — AMLODIPINE BESYLATE 5 MG/1
5 TABLET ORAL
Status: DISCONTINUED | OUTPATIENT
Start: 2019-01-01 | End: 2019-01-01

## 2019-01-01 RX ORDER — LORAZEPAM 2 MG/ML
1 INJECTION INTRAMUSCULAR
Status: DISCONTINUED | OUTPATIENT
Start: 2019-01-01 | End: 2019-01-01 | Stop reason: HOSPADM

## 2019-01-01 RX ORDER — ACETAMINOPHEN 325 MG/1
650 TABLET ORAL EVERY 4 HOURS PRN
Status: CANCELLED | OUTPATIENT
Start: 2019-01-01

## 2019-01-01 RX ORDER — ACETAMINOPHEN 325 MG/1
650 TABLET ORAL EVERY 4 HOURS PRN
Status: DISCONTINUED | OUTPATIENT
Start: 2019-01-01 | End: 2019-01-01 | Stop reason: HOSPADM

## 2019-01-01 RX ORDER — ACETAMINOPHEN 500 MG
500 TABLET ORAL ONCE
Status: COMPLETED | OUTPATIENT
Start: 2019-01-01 | End: 2019-01-01

## 2019-01-01 RX ORDER — ACETAMINOPHEN 160 MG/5ML
650 SOLUTION ORAL EVERY 4 HOURS PRN
Status: CANCELLED | OUTPATIENT
Start: 2019-01-01

## 2019-01-01 RX ORDER — MORPHINE SULFATE 20 MG/ML
10 SOLUTION ORAL
Status: DISCONTINUED | OUTPATIENT
Start: 2019-01-01 | End: 2019-01-01 | Stop reason: HOSPADM

## 2019-01-01 RX ORDER — DIPHENHYDRAMINE HCL 25 MG
25 CAPSULE ORAL ONCE
Status: COMPLETED | OUTPATIENT
Start: 2019-01-01 | End: 2019-01-01

## 2019-01-01 RX ORDER — MORPHINE SULFATE 2 MG/ML
4 INJECTION, SOLUTION INTRAMUSCULAR; INTRAVENOUS
Status: DISCONTINUED | OUTPATIENT
Start: 2019-01-01 | End: 2019-01-01 | Stop reason: HOSPADM

## 2019-01-01 RX ORDER — CLONIDINE HYDROCHLORIDE 0.1 MG/1
0.1 TABLET ORAL ONCE
Status: COMPLETED | OUTPATIENT
Start: 2019-01-01 | End: 2019-01-01

## 2019-01-01 RX ORDER — DIPHENHYDRAMINE HYDROCHLORIDE 50 MG/ML
25 INJECTION INTRAMUSCULAR; INTRAVENOUS EVERY 6 HOURS PRN
Status: DISCONTINUED | OUTPATIENT
Start: 2019-01-01 | End: 2019-01-01 | Stop reason: HOSPADM

## 2019-01-01 RX ORDER — LORAZEPAM 2 MG/ML
0.5 CONCENTRATE ORAL
Status: DISCONTINUED | OUTPATIENT
Start: 2019-01-01 | End: 2019-01-01 | Stop reason: HOSPADM

## 2019-01-01 RX ORDER — BORTEZOMIB 3.5 MG/1
1.3 INJECTION, POWDER, LYOPHILIZED, FOR SOLUTION INTRAVENOUS; SUBCUTANEOUS ONCE
Status: COMPLETED | OUTPATIENT
Start: 2019-01-01 | End: 2019-01-01

## 2019-01-01 RX ORDER — LORAZEPAM 2 MG/ML
2 CONCENTRATE ORAL
Status: DISCONTINUED | OUTPATIENT
Start: 2019-01-01 | End: 2019-01-01 | Stop reason: HOSPADM

## 2019-01-01 RX ORDER — LORAZEPAM 2 MG/ML
0.5 INJECTION INTRAMUSCULAR
Status: DISCONTINUED | OUTPATIENT
Start: 2019-01-01 | End: 2019-01-01 | Stop reason: HOSPADM

## 2019-01-01 RX ORDER — BUMETANIDE 0.5 MG/1
0.5 TABLET ORAL DAILY
Qty: 30 TABLET | Refills: 2 | Status: SHIPPED | OUTPATIENT
Start: 2019-01-01

## 2019-01-01 RX ORDER — LEVOTHYROXINE SODIUM 0.07 MG/1
75 TABLET ORAL DAILY
Qty: 90 TABLET | Refills: 3 | Status: SHIPPED | OUTPATIENT
Start: 2019-01-01

## 2019-01-01 RX ORDER — SODIUM CHLORIDE 9 MG/ML
250 INJECTION, SOLUTION INTRAVENOUS AS NEEDED
Status: DISCONTINUED | OUTPATIENT
Start: 2019-01-01 | End: 2019-01-01 | Stop reason: HOSPADM

## 2019-01-01 RX ORDER — SODIUM CHLORIDE 9 MG/ML
250 INJECTION, SOLUTION INTRAVENOUS AS NEEDED
Status: CANCELLED | OUTPATIENT
Start: 2019-01-01

## 2019-01-01 RX ORDER — SODIUM CHLORIDE 9 MG/ML
500 INJECTION, SOLUTION INTRAVENOUS ONCE
Status: COMPLETED | OUTPATIENT
Start: 2019-01-01 | End: 2019-01-01

## 2019-01-01 RX ORDER — LORAZEPAM 2 MG/ML
2 INJECTION INTRAMUSCULAR
Status: DISCONTINUED | OUTPATIENT
Start: 2019-01-01 | End: 2019-01-01 | Stop reason: HOSPADM

## 2019-01-01 RX ORDER — SCOLOPAMINE TRANSDERMAL SYSTEM 1 MG/1
1 PATCH, EXTENDED RELEASE TRANSDERMAL
Status: DISCONTINUED | OUTPATIENT
Start: 2019-01-01 | End: 2019-01-01 | Stop reason: HOSPADM

## 2019-01-01 RX ORDER — ACETAMINOPHEN 325 MG/1
325 TABLET ORAL ONCE
Status: COMPLETED | OUTPATIENT
Start: 2019-01-01 | End: 2019-01-01

## 2019-01-01 RX ORDER — ACETAMINOPHEN 325 MG/1
650 TABLET ORAL ONCE
Status: COMPLETED | OUTPATIENT
Start: 2019-01-01 | End: 2019-01-01

## 2019-01-01 RX ORDER — MORPHINE SULFATE 2 MG/ML
2 INJECTION, SOLUTION INTRAMUSCULAR; INTRAVENOUS
Status: DISCONTINUED | OUTPATIENT
Start: 2019-01-01 | End: 2019-01-01 | Stop reason: HOSPADM

## 2019-01-01 RX ORDER — DIPHENHYDRAMINE HCL 25 MG
25 CAPSULE ORAL ONCE
Status: CANCELLED | OUTPATIENT
Start: 2019-01-01 | End: 2019-01-01

## 2019-01-01 RX ORDER — DEXAMETHASONE 4 MG/1
20 TABLET ORAL WEEKLY
Qty: 20 TABLET | Refills: 3 | Status: SHIPPED | OUTPATIENT
Start: 2019-01-01

## 2019-01-01 RX ORDER — SODIUM CHLORIDE 9 MG/ML
500 INJECTION, SOLUTION INTRAVENOUS CONTINUOUS
Status: DISCONTINUED | OUTPATIENT
Start: 2019-01-01 | End: 2019-01-01 | Stop reason: HOSPADM

## 2019-01-01 RX ORDER — SODIUM CHLORIDE 9 MG/ML
1000 INJECTION, SOLUTION INTRAVENOUS ONCE
Status: COMPLETED | OUTPATIENT
Start: 2019-01-01 | End: 2019-01-01

## 2019-01-01 RX ORDER — ACETAMINOPHEN 325 MG/1
650 TABLET ORAL ONCE
Status: CANCELLED | OUTPATIENT
Start: 2019-01-01 | End: 2019-01-01

## 2019-01-01 RX ORDER — ACETAMINOPHEN 650 MG/1
650 SUPPOSITORY RECTAL EVERY 4 HOURS PRN
Status: DISCONTINUED | OUTPATIENT
Start: 2019-01-01 | End: 2019-01-01 | Stop reason: HOSPADM

## 2019-01-01 RX ORDER — MORPHINE SULFATE 20 MG/ML
20 SOLUTION ORAL
Status: DISCONTINUED | OUTPATIENT
Start: 2019-01-01 | End: 2019-01-01 | Stop reason: HOSPADM

## 2019-01-01 RX ORDER — MORPHINE SULFATE 20 MG/ML
5 SOLUTION ORAL
Status: DISCONTINUED | OUTPATIENT
Start: 2019-01-01 | End: 2019-01-01 | Stop reason: HOSPADM

## 2019-01-01 RX ORDER — DIPHENHYDRAMINE HCL 25 MG
25 CAPSULE ORAL EVERY 6 HOURS PRN
Status: DISCONTINUED | OUTPATIENT
Start: 2019-01-01 | End: 2019-01-01 | Stop reason: HOSPADM

## 2019-01-01 RX ORDER — DIPHENOXYLATE HYDROCHLORIDE AND ATROPINE SULFATE 2.5; .025 MG/1; MG/1
1 TABLET ORAL
Status: DISCONTINUED | OUTPATIENT
Start: 2019-01-01 | End: 2019-01-01 | Stop reason: HOSPADM

## 2019-01-01 RX ORDER — MORPHINE SULFATE 10 MG/ML
6 INJECTION INTRAMUSCULAR; INTRAVENOUS; SUBCUTANEOUS
Status: DISCONTINUED | OUTPATIENT
Start: 2019-01-01 | End: 2019-01-01 | Stop reason: HOSPADM

## 2019-01-01 RX ADMIN — SCOPOLAMINE 1 PATCH: 1 PATCH, EXTENDED RELEASE TRANSDERMAL at 05:31

## 2019-01-01 RX ADMIN — MORPHINE SULFATE 5 MG: 100 SOLUTION ORAL at 08:26

## 2019-01-01 RX ADMIN — LORAZEPAM 1 MG: 2 INJECTION INTRAMUSCULAR; INTRAVENOUS at 04:41

## 2019-01-01 RX ADMIN — LORAZEPAM 1 MG: 2 INJECTION INTRAMUSCULAR; INTRAVENOUS at 00:31

## 2019-01-01 RX ADMIN — MORPHINE SULFATE 2 MG: 10 INJECTION INTRAVENOUS at 17:58

## 2019-01-01 RX ADMIN — MORPHINE SULFATE 2 MG: 10 INJECTION INTRAVENOUS at 08:19

## 2019-01-01 RX ADMIN — AMLODIPINE BESYLATE 5 MG: 5 TABLET ORAL at 19:26

## 2019-01-01 RX ADMIN — MORPHINE SULFATE 5 MG: 100 SOLUTION ORAL at 19:38

## 2019-01-01 RX ADMIN — ACETAMINOPHEN 325 MG: 325 TABLET, FILM COATED ORAL at 11:26

## 2019-01-01 RX ADMIN — MORPHINE SULFATE 2 MG: 10 INJECTION INTRAVENOUS at 20:38

## 2019-01-01 RX ADMIN — MORPHINE SULFATE 2 MG: 10 INJECTION INTRAVENOUS at 04:41

## 2019-01-01 RX ADMIN — LORAZEPAM 1 MG: 2 INJECTION INTRAMUSCULAR; INTRAVENOUS at 08:19

## 2019-01-01 RX ADMIN — ACETAMINOPHEN 650 MG: 325 TABLET, FILM COATED ORAL at 11:04

## 2019-01-01 RX ADMIN — SODIUM CHLORIDE 1000 ML: 9 INJECTION, SOLUTION INTRAVENOUS at 10:45

## 2019-01-01 RX ADMIN — ACETAMINOPHEN 500 MG: 500 TABLET, FILM COATED ORAL at 12:17

## 2019-01-01 RX ADMIN — BORTEZOMIB 1.8 MG: 3.5 INJECTION, POWDER, LYOPHILIZED, FOR SOLUTION INTRAVENOUS; SUBCUTANEOUS at 11:56

## 2019-01-01 RX ADMIN — GLYCOPYRROLATE 0.4 MG: 0.2 INJECTION INTRAMUSCULAR; INTRAVENOUS at 15:18

## 2019-01-01 RX ADMIN — LORAZEPAM 0.5 MG: 2 INJECTION INTRAMUSCULAR; INTRAVENOUS at 02:30

## 2019-01-01 RX ADMIN — GLYCOPYRROLATE 0.2 MG: 0.2 INJECTION INTRAMUSCULAR; INTRAVENOUS at 05:31

## 2019-01-01 RX ADMIN — LORAZEPAM 0.5 MG: 2 INJECTION INTRAMUSCULAR; INTRAVENOUS at 20:39

## 2019-01-01 RX ADMIN — SODIUM CHLORIDE 500 ML: 900 INJECTION, SOLUTION INTRAVENOUS at 11:30

## 2019-01-01 RX ADMIN — LORAZEPAM 0.5 MG: 2 INJECTION INTRAMUSCULAR; INTRAVENOUS at 22:21

## 2019-01-01 RX ADMIN — DIPHENHYDRAMINE HYDROCHLORIDE 25 MG: 25 CAPSULE ORAL at 11:04

## 2019-01-01 RX ADMIN — SODIUM CHLORIDE 500 ML: 900 INJECTION, SOLUTION INTRAVENOUS at 10:55

## 2019-01-01 RX ADMIN — MORPHINE SULFATE 2 MG: 10 INJECTION INTRAVENOUS at 00:31

## 2019-01-01 RX ADMIN — SODIUM CHLORIDE 500 ML/HR: 900 INJECTION, SOLUTION INTRAVENOUS at 09:30

## 2019-01-01 RX ADMIN — GLYCOPYRROLATE 0.2 MG: 0.2 INJECTION INTRAMUSCULAR; INTRAVENOUS at 13:11

## 2019-01-01 RX ADMIN — SODIUM CHLORIDE 500 ML: 900 INJECTION, SOLUTION INTRAVENOUS at 10:00

## 2019-01-01 RX ADMIN — MORPHINE SULFATE 2 MG: 10 INJECTION INTRAVENOUS at 05:32

## 2019-01-01 RX ADMIN — CLONIDINE HYDROCHLORIDE 0.1 MG: 0.1 TABLET ORAL at 15:05

## 2019-01-04 NOTE — TELEPHONE ENCOUNTER
----- Message from Belkis Thorpe sent at 1/4/2019 11:51 AM EST -----  Contact: [phone]  [facility]   Calling about dexamethasone directions.

## 2019-01-04 NOTE — PROGRESS NOTES
Pt requesting tylenol 325mg due to her right shoulder aching.  Reviewed with Irais Hoffman, ARIEL- and ok to give patient tylenol.

## 2019-01-04 NOTE — TELEPHONE ENCOUNTER
Spoke with Mansoor with [facility] pharmacy clarified Dexamethasone order. Pt to take  Dex 4 mg, 5 tablets once weekly for a total of 20 mg.

## 2019-01-11 NOTE — PROGRESS NOTES
Subjective .     REASONS FOR FOLLOWUP:  multiple myeloma, anemia    HISTORY OF PRESENT ILLNESS:  The patient is a 89 y.o. year old female with the above-mentioned history who is here today for IV fluids.  We've been given her IV hydration due to worsening kidney function.  She continues to struggle with drinking water.  She is drinking about 1-2 8 ounce glasses of water per day.  After her Velcade last week she had issues with diarrhea Friday night and Saturday morning.     She and her son have also noticed a general functional decline.  The patient states that she is now in bed at least 60% of the day.  She feels like this was only about 50% of the time prior to starting therapy.  She also reports significant decrease in her energy level.  She states that she is no longer able to do things that she would like to do due to generalized weakness and fatigue.  She states that she was quite active prior to starting treatment.  Now she does not have energy to clear her kitchen table off.  She has a decent appetite.  She has some nausea, but denies vomiting.  She denies fevers or chills.  She is also reporting a skin tear on her right forearm which occurred about 2 nights ago.  She states that she accidentally scratched herself with her fingernail.  They've been using mupirocin ointment on this area.      Past Medical History:   Diagnosis Date   • Anemia     Secondary to chronic renal insufficiency and folate insufficiency   • Cataract    • Cystic mass of pancreatic head     On CT of 01/16/2009. 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   • Osteoporosis    • PICC (peripherally inserted central catheter) removal     Secondary to DVT   • Renal insufficiency     Chronic   • Small bowel perforation (CMS/HCC) 07/2010    Small bowel and cecal perforation with abcess formation and infected mesh from prior hernia repair   • Temporal arteritis (CMS/HCC) 11/10/2005   • Torn rotator cuff 2007     Past Surgical History:   Procedure Laterality Date   • BLADDER REPAIR  1995   • CATARACT EXTRACTION  2009   • COLONOSCOPY  1997   • COLONOSCOPY  2009   • COLOSTOMY  01/2009    Patient had a ruptured sigmoid diverticulum which led to a diverting colostomy   • COLOSTOMY CLOSURE     • CORRECTION HAMMER TOE  1995   • ENDOSCOPY     • EYE SURGERY  04/2007    Cataract   • GLAUCOMA SURGERY  2004   • HERNIA REPAIR  04/26/2010    Ventral hernia repair from prior sigmoid resection   • HYSTERECTOMY  1995   • INCISIONAL HERNIA REPAIR  2010    Dr. Mcgee   • JOINT REPLACEMENT  2002    Bilateral knee replacements       HEMATOLOGIC/ONCOLOGIC HISTORY:  (History from previous dates can be found in the separate document.)    MEDICATIONS    Current Outpatient Medications:   •  acetaminophen (TYLENOL) 500 MG tablet, Take 1 tablet by mouth Every 6 (Six) Hours As Needed for mild pain (1-3)., Disp: 30 tablet, Rfl: 0  •  acyclovir (ZOVIRAX) 400 MG tablet, Take 0.5 tablets by mouth 2 (Two) Times a Day., Disp: 60 tablet, Rfl: 7  •  amLODIPine (NORVASC) 5 MG tablet, TAKE 1 TABLET BY MOUTH ONE TIME A DAY , Disp: 90 tablet, Rfl: 0  •  B Complex Vitamins (VITAMIN B COMPLEX) capsule capsule, Take 1 capsule by mouth Daily., Disp: , Rfl:   •  bumetanide (BUMEX) 1 MG tablet, TAKE 1 TABLET BY MOUTH ONE TIME A DAY , Disp: 90 tablet, Rfl: 0  •  calcium-vitamin D (OSCAL-500) 500-200 MG-UNIT per tablet, Take 1 tablet by mouth., Disp: , Rfl:   •  cephalexin (KEFLEX) 500 MG capsule, Take 1 capsule by mouth 2 (Two) Times a Day., Disp: 14 capsule, Rfl: 0  •  cholecalciferol (VITAMIN  D3) 1000 UNITS tablet, Take 1,000 Units by mouth Daily., Disp: , Rfl:   •  dexamethasone (DECADRON) 4 MG tablet, Take 5 tablets by mouth 1 (One) Time Per Week., Disp: 20 tablet, Rfl: 3  •  HYDROcodone-acetaminophen (NORCO) 7.5-325 MG per tablet, Take 1 tablet by mouth Every 4 (Four) Hours As Needed for Moderate Pain ., Disp: 24 tablet, Rfl: 0  •  levothyroxine (SYNTHROID, LEVOTHROID) 75 MCG tablet, Take 1 tablet by mouth Daily., Disp: 90 tablet, Rfl: 1  •  levothyroxine (SYNTHROID, LEVOTHROID) 75 MCG tablet, TAKE 1 TABLET BY MOUTH ONE TIME A DAY , Disp: 90 tablet, Rfl: 0  •  Multiple Vitamins-Calcium (ONE-A-DAY WOMENS PO), Take  by mouth., Disp: , Rfl:   •  mupirocin (BACTROBAN) 2 % nasal ointment, Apply topically twice a day to right leg wound, Disp: 15 g, Rfl: 2  •  ondansetron (ZOFRAN) 8 MG tablet, Take 1 tablet by mouth 3 (Three) Times a Day As Needed for Nausea or Vomiting., Disp: 30 tablet, Rfl: 5  •  predniSONE (DELTASONE) 10 MG tablet, 2 by mouth twice a day for 4 days, then one twice a day for 4 days, then 1 every morning until taken, Disp: 28 tablet, Rfl: 0  No current facility-administered medications for this visit.     ALLERGIES:     Allergies   Allergen Reactions   • Sulfa Antibiotics        SOCIAL HISTORY:       Social History     Socioeconomic History   • Marital status:      Spouse name: Not on file   • Number of children: Not on file   • Years of education: Not on file   • Highest education level: Not on file   Social Needs   • Financial resource strain: Not on file   • Food insecurity - worry: Not on file   • Food insecurity - inability: Not on file   • Transportation needs - medical: Not on file   • Transportation needs - non-medical: Not on file   Occupational History     Employer: [employer]     Employer: RETIRED   Tobacco Use   • Smoking status: Former Smoker   • Smokeless tobacco: Never Used   Substance and Sexual Activity   • Alcohol use: No   • Drug use: No   • Sexual  activity: Not on file   Other Topics Concern   • Not on file   Social History Narrative    Spouse  from what sounds like cholangiocarcinoma.         FAMILY HISTORY:  Family History   Problem Relation Age of Onset   • Cancer Neg Hx        REVIEW OF SYSTEMS:  Review of Systems   Constitutional: Positive for activity change and fatigue.   HENT: Negative for nosebleeds and trouble swallowing.    Respiratory: Negative for shortness of breath and wheezing.    Cardiovascular: Positive for leg swelling. Negative for chest pain and palpitations.   Gastrointestinal: Positive for diarrhea. Negative for constipation and nausea.   Genitourinary: Negative for dysuria and hematuria.   Musculoskeletal: Negative for arthralgias and myalgias.   Skin: Negative for rash and wound.        See HPI   Neurological: Positive for weakness. Negative for seizures and syncope.   Hematological: Negative for adenopathy. Does not bruise/bleed easily.   Psychiatric/Behavioral: Negative for confusion.       Objective    There were no vitals filed for this visit.  Current Status 2019   ECOG score 3      Physical Exam   Constitutional: She is oriented to person, place, and time. No distress.   Thin, elderly, frail-appearing female accompanied by her son, in no acute distress.   HENT:   Head: Normocephalic and atraumatic.   Eyes: Pupils are equal, round, and reactive to light.   Neck: Normal range of motion.   Cardiovascular: Normal rate, regular rhythm and normal heart sounds.   No murmur heard.  Pulmonary/Chest: Effort normal and breath sounds normal. No respiratory distress. She has no wheezes. She has no rhonchi. She has no rales.   Abdominal: Soft. Normal appearance and bowel sounds are normal. She exhibits no distension. There is no hepatosplenomegaly.   Musculoskeletal: Normal range of motion. She exhibits edema (1+ bilateral edema left greater than right).   Neurological: She is alert and oriented to person, place, and time.   Skin:  Skin is warm and dry. No rash noted.   Right shin wounds measures about 1 cm in size.  No surrounding erythema or signs of infection.    Right forearm wound measuring at least 5 cm in size.  The patient has an abrasion/skin tear.  There is some surrounding erythema, but there is no warmth.   Psychiatric: She has a normal mood and affect.   Vitals reviewed.    RECENT LABS:        WBC   Date/Time Value Ref Range Status   01/11/2019 08:53 AM 4.76 4.00 - 10.00 10*3/mm3 Final     Hemoglobin   Date/Time Value Ref Range Status   01/11/2019 08:53 AM 9.3 (L) 11.5 - 14.9 g/dL Final     Platelets   Date/Time Value Ref Range Status   01/11/2019 08:53 AM 92 (L) 150 - 375 10*3/mm3 Final     Comment:     plts verified=86     Lab Results   Component Value Date    GLUCOSE 78 01/11/2019    BUN 51 (H) 01/11/2019    CREATININE 3.00 (C) 01/11/2019    EGFRIFNONA 15 (L) 01/11/2019    EGFRIFAFRI 30 (L) 04/23/2018    BCR 17.0 01/11/2019    K 3.8 01/11/2019    CO2 14.2 (L) 01/11/2019    CALCIUM 7.2 (L) 01/11/2019    PROTENTOTREF 6.5 11/19/2018    ALBUMIN 3.30 (L) 01/11/2019    LABIL2 1.4 11/19/2018    AST 25 01/11/2019    ALT 22 01/11/2019       Assessment/Plan     ASSESSMENT:  *IgG kappa multiple myeloma.  (Bone marrow 3/31/11 revealed 10% monoclonal IgG kappa plasma cells.  This was smoldering myeloma until she became symptomatic from a calcium of 11.6 on 11/19/18)  Main issue is anemia was anemia which was successfully treated with Procrit.  However, developed a calcium 11.6 on 11/19/18 and creatinine rise up to 3 on 12/20/18.  PET 12/11/18 negative for myeloma bone lesions  · Serum M spike stable at 0.8.  · Kappa/lambda ratio in urine 111 (no prior for comparison).  We do not have a recent serum kappa/lambda ratio.   Discussed this case with Dr. Danielle from our practice and he agrees with my assessment of this now being multiple myeloma and with the need to begin treatment.  Also discussed this with Dr. Federico Cagle, her nephrologist.   He agrees the kidney dysfunction is likely from multiple myeloma.  Typically, IUs Revlimid 15 mg daily D1-21/28 days with Decadron 20 mg weekly for elderly patients.  However, with her renal dysfunction this would need to be dose reduced, likely to 2.5 mg daily D1-21/28 days.  With renal dysfunction, leukocytopenia can become more problematic from Revlimid.  Therefore, generally Revlimid is avoided with acute renal insufficiency.  Therefore, I have recommended Velcade weekly 3 out of 4 weeks with Decadron 20 mg weekly.  Patient and sons want to proceed with therapy.  They understand this is not curable.  Transportation is problematic and therefore weekly Velcade is not a good long-term solution for her.  Furthermore, she is frail with limited mobility and has bilateral finger numbness, suspected to be due to cervical disc disease.  I think significant peripheral neuropathy from Velcade would be quite problematic for her quality of life.  My plan is to hopefully improve creatinine with Velcade and then switch to Revlimid and Decadron.  · Patient returns 1/4/2019 for cycle 1, day 15 Velcade.  Unfortunately, her creatinine is elevated to 3.01 to day (previously down to 2.6).  We will proceed with 1 L of IV normal saline again in the office as well as her Velcade.  She will return in one week for repeat stat CMP and possible IV fluids.  I encouraged the patient to try to at least double up on the water that she drinks at home, if not more.  · Patient returns 1/11/2019 for IV hydration reevaluation.  She complains of generalized decline and weakness since starting treatment.  She is only receiving IV hydration today.  They will follow-up with Dr. wade next week for reevaluation, and decide if she wants to pursue further treatment.    *Hypercalcemia.  Calcium highest value 11.6 on 11/19/18.  · Xgeva, monthly, dose 1 on 11/21/18.  · Calcium normal currently.  Continue Xgeva monthly.  · 01/11/2019 patient is now  hypocalcemic with a calcium level 7.2.  Advise her to resume her calcium.  She needs at least 3182-4244 mg of calcium daily.    *Iron deficiency anemia.  (In the past, difficulty tolerating by mouth iron).  However, has been on iron 3 times per week since the fall 2012.  Hemoglobin is stable.  History of Feraheme  Last iron labs November 2018, unremarkable.    *Anemia of chronic renal insufficiency, stage III.  She likes to minimize Procrit.  Has not received Procrit for quite some time.  Sees Dr. Federico Cagle.    *Acute kidney injury on chronic kidney disease, stage III.  Creatinine gradually worsening.  From 1.4-1.5, up to 1.8-1.9.  Creatinine carlitos to 3 on 12/20/18, prompting an urgent start of Velcade and Decadron.  12/20/2018 creatinine improved to 2.63.  1/11/2019 creatinine is 3.    *PICC associated DVT August 2010.  Completed 6 months Coumadin 3/20/11.  (If treatment for myeloma is given, will need to keep this in mind, since she had a clot in the past).    *Leg wound after a fall.  Occurred around late November 2018.  Wound is healing.  Patient and sons understand neutropenia from chemotherapy can delay healing and increase the risk of infection.    *Right forearm wound from scratch: Advise him to continue using mupirocin ointment to this area.  Closely monitor for worsening erythema, warmth, or other signs of infection.    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  Completed 2 years of CAT scan follow-up 02/02/11.  Plan no more routine CAT scans.    *On 1/23/18 visit: Left leg swelling times 6-9 months.  I recommended she have a left leg Doppler today and if positive receive Lovenox daily through our office while awaiting a therapeutic INR on Coumadin.  I told her an untreated DVT could be life-threatening due to subsequent pulmonary embolism.  She understands this but absolutely refuses workup or treatment of this.  She does not like coming to doctor's appointments.  She doesn't want any more visits  or trips in.    PLAN:  · 1 L normal saline today.  · Continue Decadron 20 mg weekly.  · Resume calcium at least 2286-4126 mg daily.  · Return in one week for follow-up visit with Dr. wade for reevaluation, she will be due to start cycle 2 Velcade at that time.  · When renal dysfunction improves, may change to Revlimid and Decadron both for transportation issues and to decrease risk of neuropathy.    · Monthly Xgeva, (last 12/20/2018).    · Stat CMP weekly for now  · Check SPEP, SIFE, serum free light chains and iron labs every 1-2 months

## 2019-01-16 NOTE — PROGRESS NOTES
Elevated Cr    HPI    Raina Jackson is a 89 y.o. female patient of Michael Selby Jr., MD who is having tx at [facility] group for Multiple Myeloma.  Started tx about one month ago.  This Friday will be 5th tx.   She has CKD - Cr has increased.  Per note from [facility] was discussed with Dr Cagle and felt r/t MM.  Pt states since starting tx - she's not eating or drinking as much.  She initially had some nausea but that has improved.  She feels her intake has improved.  She is accompanied by her son and states she still lives alone and has frozen meals etc at home. Believes she is compliant with all her rx.  She takes a norco at night for pain - is a little sleepy/foggy in the am but it clears after she's been up.  States she gets anxious because she doesn't want to miss an appointment so will start looking for them well before appointment time.  She will occasionally have diarrhea but this is chronic.  She had recurrent c.diff in the past and had fecal transplant in the past.  She denies more than 2 bms daily.     She states she is still voiding well. She says she has always had some edema in legs.  She sits in chair most of the day but can ambulate short distances.  Son notices there is more fatigue when ambulating but this has been progressing.       She is on bumex 1 mg daily.  She and son deny ever having chf.  Review of old notes she has been on for years for HTN.      R forearm - patient scratched arm and had skin tear.  Has scant amount drainage. Using otc ointment.  Getting better.         I have reviewed the patient's medical history in detail and updated the computerized patient record.    Past Medical History:   Diagnosis Date   • Anemia     Secondary to chronic renal insufficiency and folate insufficiency   • Cataract    • Cystic mass of pancreatic head     On CT of 01/16/2009. stable in 02/2011   • DVT (deep venous thrombosis) (CMS/[facility]) 08/2010    PICC-associated DVT resulting in removal of PICC   • Folate  deficiency    • Frequent UTI    • Gout    • H/O RAJESH (acute kidney injury)    • H/O Clostridium difficile colitis    • Hammer toe 1995    S/P correction   • History of shingles    • Hypertension    • Hypogammaglobulinemia (CMS/HCC)    • Left hip pain    • Myeloma (CMS/HCC)     Smoldering myeloma versus multiple myeloma   • Osteoporosis    • PICC (peripherally inserted central catheter) removal     Secondary to DVT   • Renal insufficiency     Chronic   • Small bowel perforation (CMS/HCC) 07/2010    Small bowel and cecal perforation with abcess formation and infected mesh from prior hernia repair   • Temporal arteritis (CMS/HCC) 11/10/2005   • Torn rotator cuff 2007       Past Surgical History:   Procedure Laterality Date   • BLADDER REPAIR  1995   • CATARACT EXTRACTION  2009   • COLONOSCOPY  1997   • COLONOSCOPY  2009   • COLOSTOMY  01/2009    Patient had a ruptured sigmoid diverticulum which led to a diverting colostomy   • COLOSTOMY CLOSURE     • CORRECTION HAMMER TOE  1995   • ENDOSCOPY     • EYE SURGERY  04/2007    Cataract   • GLAUCOMA SURGERY  2004   • HERNIA REPAIR  04/26/2010    Ventral hernia repair from prior sigmoid resection   • HYSTERECTOMY  1995   • INCISIONAL HERNIA REPAIR  2010    Dr. Mcgee   • JOINT REPLACEMENT  2002    Bilateral knee replacements       Family History   Problem Relation Age of Onset   • Cancer Neg Hx        Social History     Socioeconomic History   • Marital status:      Spouse name: Not on file   • Number of children: Not on file   • Years of education: Not on file   • Highest education level: Not on file   Social Needs   • Financial resource strain: Not on file   • Food insecurity - worry: Not on file   • Food insecurity - inability: Not on file   • Transportation needs - medical: Not on file   • Transportation needs - non-medical: Not on file   Occupational History     Employer: [employer]     Employer: RETIRED   Tobacco Use   • Smoking status: Former  "Smoker   • Smokeless tobacco: Never Used   Substance and Sexual Activity   • Alcohol use: No   • Drug use: No   • Sexual activity: Not on file   Other Topics Concern   • Not on file   Social History Narrative    Spouse  from what sounds like cholangiocarcinoma.       Most Recent Immunizations   Administered Date(s) Administered   • Tdap 2018       Review of Systems:   Review of Systems   Constitutional: Positive for fatigue. Negative for fever and unexpected weight change.   Respiratory: Negative for shortness of breath.    Cardiovascular: Negative for chest pain.       Physical Exam:   Physical Exam   Constitutional: She is oriented to person, place, and time.   [place]rail, [place]   HENT:   Mouth/Throat: Mucous membranes are normal.   Cardiovascular: Normal rate and regular rhythm.   FLORY 1+ bilat (no erythema)    Pulmonary/Chest: Effort normal and breath sounds normal.   Abdominal: Soft. Bowel sounds are normal.   Neurological: She is oriented to person, place, and time.   Skin:        R - FA 2x3cm skin tear - scant drainage. PW without erythema         Vital Signs     Vitals:    19 0949   BP: 155/71   BP Location: Left arm   Patient Position: Sitting   Cuff Size: Small Adult   Pulse: 74   Temp: 97 °F (36.1 °C)   TempSrc: Oral   SpO2: 96%   Weight: 46.1 kg (101 lb 9.6 oz)   Height: 152.4 cm (60\")          Results Review:      REVIEWED AND DISCUSSED LAB RESULTS WITH PATIENT      Requested Prescriptions     Signed Prescriptions Disp Refills   • bumetanide (BUMEX) 0.5 MG tablet 30 tablet 2     Sig: Take 1 tablet by mouth Daily.         Current Outpatient Medications:   •  acetaminophen (TYLENOL) 500 MG tablet, Take 1 tablet by mouth Every 6 (Six) Hours As Needed for mild pain (1-3)., Disp: 30 tablet, Rfl: 0  •  acyclovir (ZOVIRAX) 400 MG tablet, Take 0.5 tablets by mouth 2 (Two) Times a Day., Disp: 60 tablet, Rfl: 7  •  amLODIPine (NORVASC) 5 MG tablet, TAKE 1 TABLET BY MOUTH ONE TIME A DAY , Disp: 90 tablet, " Rfl: 0  •  B Complex Vitamins (VITAMIN B COMPLEX) capsule capsule, Take 1 capsule by mouth Daily., Disp: , Rfl:   •  calcium-vitamin D (OSCAL-500) 500-200 MG-UNIT per tablet, Take 1 tablet by mouth., Disp: , Rfl:   •  cholecalciferol (VITAMIN D3) 1000 UNITS tablet, Take 1,000 Units by mouth Daily., Disp: , Rfl:   •  dexamethasone (DECADRON) 4 MG tablet, Take 5 tablets by mouth 1 (One) Time Per Week., Disp: 20 tablet, Rfl: 3  •  HYDROcodone-acetaminophen (NORCO) 7.5-325 MG per tablet, Take 1 tablet by mouth Every 4 (Four) Hours As Needed for Moderate Pain ., Disp: 24 tablet, Rfl: 0  •  levothyroxine (SYNTHROID, LEVOTHROID) 75 MCG tablet, Take 1 tablet by mouth Daily., Disp: 90 tablet, Rfl: 3  •  Multiple Vitamins-Calcium (ONE-A-DAY WOMENS PO), Take  by mouth., Disp: , Rfl:   •  mupirocin (BACTROBAN) 2 % nasal ointment, Apply topically twice a day to right leg wound, Disp: 15 g, Rfl: 2  •  ondansetron (ZOFRAN) 8 MG tablet, Take 1 tablet by mouth 3 (Three) Times a Day As Needed for Nausea or Vomiting., Disp: 30 tablet, Rfl: 5  •  bumetanide (BUMEX) 0.5 MG tablet, Take 1 tablet by mouth Daily., Disp: 30 tablet, Rfl: 2          Assessment / Plan:   Diagnoses and all orders for this visit:    Multiple myeloma not having achieved remission (CMS/HCC)    Stage 3 chronic kidney disease (CMS/HCC)    Wound drainage    Other orders  -     bumetanide (BUMEX) 0.5 MG tablet; Take 1 tablet by mouth Daily.        Problem List Items Addressed This Visit        Genitourinary    Chronic kidney disease    Relevant Medications    bumetanide (BUMEX) 0.5 MG tablet       Hematopoietic and Hemostatic    Multiple myeloma not having achieved remission (CMS/HCC) - Primary      Other Visit Diagnoses     Wound drainage            Decrease Bumex to 0.5 mg daily.      Will get labs at [facility] group this Friday and see if renal function improved. With her decrease in intake/ fluids since starting tx - bumex could have been worsening the problem.  "    Elevate legs above heart at night     FU w Renal - Gurjit if continues to be an issue.     Bactroban to R FA BID - if gets redness around the area - call for abx.     Son states family is considering living options as she still lives alone and they plan to make a change soon.     Discussed any change in Rx and discussed visit with patient.  All questions answered.        Return for FU as scheduled or PRN .    Mukund \"Ion\" Miguel, APRN   01/16/19  6:02 PM  "

## 2019-01-16 NOTE — PATIENT INSTRUCTIONS
Chronic Kidney Disease, Adult  Chronic kidney disease (CKD) occurs when the kidneys become damaged slowly over a long period of time. The kidneys are a pair of organs that do many important jobs in the body, including:  · Removing waste and extra fluid from the blood to make urine.  · Making hormones that maintain the amount of fluid in tissues and blood vessels.  · Maintaining the right amount of fluids and chemicals in the body.    A small amount of kidney damage may not cause problems, but a large amount of damage may make it hard or impossible for the kidneys to work the way they should. If steps are not taken to slow down kidney damage or to stop it from getting worse, the kidneys may stop working permanently (end-stage renal disease or ESRD). Most of the time, CKD does not go away, but it can often be controlled. People who have CKD are usually able to live normal lives.  What are the causes?  The most common causes of this condition are diabetes and high blood pressure (hypertension). Other causes include:  · Heart and blood vessel (cardiovascular) disease.  · Kidney diseases, such as:  ? Glomerulonephritis.  ? Interstitial nephritis.  ? Polycystic kidney disease.  ? Renal vascular disease.  · Diseases that affect the immune system.  · Genetic diseases.  · Medicines that damage the kidneys, such as anti-inflammatory medicines.  · Being around or being in contact with poisonous (toxic) substances.  · A kidney or urinary infection that occurs again and again (recurs).  · Vasculitis. This is swelling or inflammation of the blood vessels.  · A problem with urine flow that may be caused by:  ? Cancer.  ? Having kidney stones more than one time.  ? An enlarged prostate, in males.    What increases the risk?  You are more likely to develop this condition if you:  · Are older than age 60.  · Are female.  · Are -American, , , , or .  · Are a current or former  smoker.  · Are obese.  · Have a family history of kidney disease or failure.  · Often take medicines that are damaging to the kidneys.    What are the signs or symptoms?  Symptoms of this condition include:  · Swelling (edema) of the face, legs, ankles, or feet.  · Tiredness (lethargy) and having less energy.  · Nausea or vomiting.  · Confusion or trouble concentrating.  · Problems with urination, such as:  ? Painful or burning feeling during urination.  ? Decreased urine production.  ? Frequent urination, especially at night.  ? Bloody urine.  · Muscle twitches and cramps, especially in the legs.  · Shortness of breath.  · Weakness.  · Loss of appetite.  · Metallic taste in the mouth.  · Trouble sleeping.  · Dry, itchy skin.  · A low blood count (anemia).  · Pale lining of the eyelids and surface of the eye (conjunctiva).    Symptoms develop slowly and may not be obvious until the kidney damage becomes severe. It is possible to have kidney disease for years without having any symptoms.  How is this diagnosed?  This condition may be diagnosed based on:  · Blood tests.  · Urine tests.  · Imaging tests, such as an ultrasound or CT scan.  · A test in which a sample of tissue is removed from the kidneys to be examined under a microscope (kidney biopsy).    These test results will help your health care provider determine how serious the CKD is.  How is this treated?  There is no cure for most cases of this condition, but treatment usually relieves symptoms and prevents or slows the progression of the disease. Treatment may include:  · Making diet changes, which may require you to avoid alcohol, salty foods (sodium), and foods that are high in potassium, calcium, and protein.  · Medicines:  ? To lower blood pressure.  ? To control blood glucose.  ? To relieve anemia.  ? To relieve swelling.  ? To protect your bones.  ? To improve the balance of electrolytes in your blood.  · Removing toxic waste from the body through  types of dialysis, if the kidneys can no longer do their job (kidney failure).  · Managing any other conditions that are causing your CKD or making it worse.    Follow these instructions at home:  Medicines  · Take over-the-counter and prescription medicines only as told by your health care provider. The dose of some medicines that you take may need to be adjusted.  · Do not take any new medicines unless approved by your health care provider. Many medicines can worsen your kidney damage.  · Do not take any vitamin and mineral supplements unless approved by your health care provider. Many nutritional supplements can worsen your kidney damage.  General instructions  · Follow your prescribed diet as told by your health care provider.  · Do not use any products that contain nicotine or tobacco, such as cigarettes and e-cigarettes. If you need help quitting, ask your health care provider.  · Monitor and track your blood pressure at home. Report changes in your blood pressure as told by your health care provider.  · If you are being treated for diabetes, monitor and track your blood sugar (blood glucose) levels as told by your health care provider.  · Maintain a healthy weight. If you need help with this, ask your health care provider.  · Start or continue an exercise plan. Exercise at least 30 minutes a day, 5 days a week.  · Keep your immunizations up to date as told by your health care provider.  · Keep all follow-up visits as told by your health care provider. This is important.  Where to find more information:  · American Association of Kidney Patients: www.aakp.org  · National Kidney Foundation: www.kidney.org  · American Kidney Fund: www.akfinc.org  · [facility]: www.lifeoptions.org and www.kidneyschool.org  Contact a health care provider if:  · Your symptoms get worse.  · You develop new symptoms.  Get help right away if:  · You develop symptoms of ESRD, which  include:  ? Headaches.  ? Numbness in the hands or feet.  ? Easy bruising.  ? Frequent hiccups.  ? Chest pain.  ? Shortness of breath.  ? Lack of menstruation, in women.  · You have a fever.  · You have decreased urine production.  · You have pain or bleeding when you urinate.  Summary  · Chronic kidney disease (CKD) occurs when the kidneys become damaged slowly over a long period of time.  · The most common causes of this condition are diabetes and high blood pressure (hypertension).  · There is no cure for most cases of this condition, but treatment usually relieves symptoms and prevents or slows the progression of the disease. Treatment may include a combination of medicines and lifestyle changes.  This information is not intended to replace advice given to you by your health care provider. Make sure you discuss any questions you have with your health care provider.  Document Released: 09/26/2009 Document Revised: 01/25/2018 Document Reviewed: 01/25/2018  [employer] Patient Education © 2018 [employer]

## 2019-01-17 NOTE — PROGRESS NOTES
Subjective .     REASONS FOR FOLLOWUP:  multiple myeloma, anemia    HISTORY OF PRESENT ILLNESS:  The patient is a 89 y.o. year old female  who is here for follow-up with the above-mentioned history.    States she is not at all happy with her quality of life.  Her sons are with her.  They helped with most of the history.  She is more weak.  She has had progressively more diarrhea with each dose of Velcade.  With her last dose of Velcade she had 2-3 days of significant diarrhea.  She drinks around 20 ounces of fluid the entire day.  Not eating much.  Note 5 to eat.  Also, altered taste.    Son state she had some very mild memory impairment prior to chemotherapy.  However, since chemotherapy she has had some slowness to express herself.  She has gotten her sons confused and thought one son was the other (not just accidentally said the wrong name but actually thought one son was the other).  She has called one of her sons at 3 AM before doctor's appointment on 2 separate occasions asking why they weren't there to bring her to her appointment.  On another occasion, she was waiting in the garage with the garage door open in the cold winter weather because she thought her son was late to bring her to a doctor's appointment.  She has had some generalized swelling ankles and hands.  She is having to use a wheelchair more now.    Denies neuropathy.    She was too weak to stand up to be weighed today.    Past Medical History:   Diagnosis Date   • Anemia     Secondary to chronic renal insufficiency and folate insufficiency   • Cataract    • Cystic mass of pancreatic head     On CT of 01/16/2009. stable in 02/2011   • DVT (deep venous thrombosis) (CMS/[facility]) 08/2010    PICC-associated DVT resulting in removal of PICC   • Folate deficiency    • Frequent UTI    • Gout    • H/O RAJESH (acute kidney injury)    • H/O Clostridium difficile colitis    • Hammer toe 1995    S/P correction   • History of shingles    • Hypertension    •  Hypogammaglobulinemia (CMS/HCC)    • Left hip pain    • Myeloma (CMS/HCC)     Smoldering myeloma versus multiple myeloma   • Osteoporosis    • PICC (peripherally inserted central catheter) removal     Secondary to DVT   • Renal insufficiency     Chronic   • Small bowel perforation (CMS/HCC) 07/2010    Small bowel and cecal perforation with abcess formation and infected mesh from prior hernia repair   • Temporal arteritis (CMS/HCC) 11/10/2005   • Torn rotator cuff 2007     Past Surgical History:   Procedure Laterality Date   • BLADDER REPAIR  1995   • CATARACT EXTRACTION  2009   • COLONOSCOPY  1997   • COLONOSCOPY  2009   • COLOSTOMY  01/2009    Patient had a ruptured sigmoid diverticulum which led to a diverting colostomy   • COLOSTOMY CLOSURE     • CORRECTION HAMMER TOE  1995   • ENDOSCOPY     • EYE SURGERY  04/2007    Cataract   • GLAUCOMA SURGERY  2004   • HERNIA REPAIR  04/26/2010    Ventral hernia repair from prior sigmoid resection   • HYSTERECTOMY  1995   • INCISIONAL HERNIA REPAIR  2010    Dr. Mcgee   • JOINT REPLACEMENT  2002    Bilateral knee replacements       HEMATOLOGIC/ONCOLOGIC HISTORY:  (History from previous dates can be found in the separate document.)    MEDICATIONS    Current Outpatient Medications:   •  acetaminophen (TYLENOL) 500 MG tablet, Take 1 tablet by mouth Every 6 (Six) Hours As Needed for mild pain (1-3)., Disp: 30 tablet, Rfl: 0  •  acyclovir (ZOVIRAX) 400 MG tablet, Take 0.5 tablets by mouth 2 (Two) Times a Day., Disp: 60 tablet, Rfl: 7  •  amLODIPine (NORVASC) 5 MG tablet, TAKE 1 TABLET BY MOUTH ONE TIME A DAY , Disp: 90 tablet, Rfl: 0  •  B Complex Vitamins (VITAMIN B COMPLEX) capsule capsule, Take 1 capsule by mouth Daily., Disp: , Rfl:   •  bumetanide (BUMEX) 0.5 MG tablet, Take 1 tablet by mouth Daily., Disp: 30 tablet, Rfl: 2  •  calcium-vitamin D (OSCAL-500) 500-200 MG-UNIT per tablet, Take 1 tablet by mouth., Disp: , Rfl:   •  cholecalciferol (VITAMIN D3) 1000 UNITS tablet,  Take 1,000 Units by mouth Daily., Disp: , Rfl:   •  dexamethasone (DECADRON) 4 MG tablet, Take 5 tablets by mouth 1 (One) Time Per Week., Disp: 20 tablet, Rfl: 3  •  HYDROcodone-acetaminophen (NORCO) 7.5-325 MG per tablet, Take 1 tablet by mouth Every 4 (Four) Hours As Needed for Moderate Pain ., Disp: 24 tablet, Rfl: 0  •  levothyroxine (SYNTHROID, LEVOTHROID) 75 MCG tablet, Take 1 tablet by mouth Daily., Disp: 90 tablet, Rfl: 3  •  Multiple Vitamins-Calcium (ONE-A-DAY WOMENS PO), Take  by mouth., Disp: , Rfl:   •  mupirocin (BACTROBAN) 2 % nasal ointment, Apply topically twice a day to right leg wound, Disp: 15 g, Rfl: 2  •  ondansetron (ZOFRAN) 8 MG tablet, Take 1 tablet by mouth 3 (Three) Times a Day As Needed for Nausea or Vomiting., Disp: 30 tablet, Rfl: 5    ALLERGIES:     Allergies   Allergen Reactions   • Sulfa Antibiotics        SOCIAL HISTORY:       Social History     Socioeconomic History   • Marital status:      Spouse name: Not on file   • Number of children: Not on file   • Years of education: Not on file   • Highest education level: Not on file   Social Needs   • Financial resource strain: Not on file   • Food insecurity - worry: Not on file   • Food insecurity - inability: Not on file   • Transportation needs - medical: Not on file   • Transportation needs - non-medical: Not on file   Occupational History     Employer: [employer]     Employer: RETIRED   Tobacco Use   • Smoking status: Former Smoker   • Smokeless tobacco: Never Used   Substance and Sexual Activity   • Alcohol use: No   • Drug use: No   • Sexual activity: Not on file   Other Topics Concern   • Not on file   Social History Narrative    Spouse  from what sounds like cholangiocarcinoma.         FAMILY HISTORY:  Family History   Problem Relation Age of Onset   • Cancer Neg Hx        REVIEW OF SYSTEMS:  Review of Systems   Constitutional: Positive for activity change, appetite change and fatigue.   HENT: Negative  for nosebleeds and trouble swallowing.    Respiratory: Negative for shortness of breath and wheezing.    Cardiovascular: Negative for chest pain and palpitations.   Gastrointestinal: Positive for diarrhea. Negative for constipation and nausea.   Genitourinary: Negative for dysuria and hematuria.   Musculoskeletal: Negative for arthralgias and myalgias.   Skin: Negative for rash and wound.   Neurological: Negative for seizures and syncope.   Hematological: Negative for adenopathy. Does not bruise/bleed easily.   Psychiatric/Behavioral: Positive for confusion.          Objective    There were no vitals filed for this visit.  Current Status 1/11/2019   ECOG score 3      PHYSICAL EXAM:    CONSTITUTIONAL:  Vital signs reviewed.  No distress, looks comfortable.  Looks more frail and weak than usual.  EYES:  Conjunctiva and lids unremarkable.  PERRLA  EARS,NOSE,MOUTH,THROAT:  Ears and nose appear unremarkable.  Lips, teeth, gums appear unremarkable.  RESPIRATORY:  Normal respiratory effort.  Lungs clear to auscultation bilaterally.  CARDIOVASCULAR:  Normal S1, S2.  No murmurs rubs or gallops.  2+ bilateral lower extremity edema.  GASTROINTESTINAL: Abdomen appears unremarkable.  Nontender.  No hepatomegaly.  No splenomegaly.  LYMPHATIC:  No cervical, supraclavicular, axillary lymphadenopathy.  SKIN:  Warm.  No rashes.  PSYCHIATRIC:  Normal judgment and insight.  Normal mood and affect.      RECENT LABS:        WBC   Date/Time Value Ref Range Status   01/11/2019 08:53 AM 4.76 4.00 - 10.00 10*3/mm3 Final     Hemoglobin   Date/Time Value Ref Range Status   01/11/2019 08:53 AM 9.3 (L) 11.5 - 14.9 g/dL Final     Platelets   Date/Time Value Ref Range Status   01/11/2019 08:53 AM 92 (L) 150 - 375 10*3/mm3 Final     Comment:     plts verified=86       Assessment/Plan     ASSESSMENT:  *IgG kappa multiple myeloma.  (Bone marrow 3/31/11 revealed 10% monoclonal IgG kappa plasma cells.  This was smoldering myeloma until she became  symptomatic from a calcium of 11.6 on 11/19/18)  Main issue is anemia was anemia which was successfully treated with Procrit.  However, developed a calcium 11.6 on 11/19/18 and creatinine rise up to 3 on 12/20/18.  PET 12/11/18 negative for myeloma bone lesions  Baseline studies, December 2018, prior to Velcade:  · Serum M spike stable at 0.8.  · Kappa/lambda ratio in urine 111 (no prior for comparison).  · Kappa/lambda ratio serum 83.6.  She was due to begin C2 D1 Velcade today, 1/18/19.  However, poor tolerance to cycle 1 with progressive diarrhea, worsened of already impaired oral nutrition and oral hydration, and worsening of what may have been in hindsight some very mild dementia (sons state very slightly forgetful prior to chemotherapy but significant cognitive issues since chemotherapy).  I told patient and sons I do not think further Velcade is appropriate considering how much trouble she had with this cycle.  I told them I think stopping chemotherapy and using hospice with the goal of comfort care is very appropriate.  However, they would like to see if she improves over the next couple of weeks to see if Revlimid could be an option.  (My last note can be referenced regarding Revlimid dosing)    *Confusion.  Suspect a worsening of underlying baseline mild dementia.  No formal diagnosis of dementia.  However, sons reports she was mildly forgetful prior to chemotherapy.  Difficulty expressing thoughts.  Mixing up her  sons.  Mixing dates and times.  Calling her sons at 3 AM wondering why they are not there to bring her to her doctor's appointments.  Velcade could be causing this.  Decadron may be contributing to this as well.  Stop both Velcade and Decadron.    *Hypercalcemia.  Calcium highest value 11.6 on 11/19/18.  · Xgeva, monthly, dose 1 on 11/21/18.  · Calcium normal currently.    · Did not give Xgeva today as we are considering stopping therapy.    *Iron deficiency anemia.  (In the past, difficulty  tolerating by mouth iron).  However, has been on iron 3 times per week since the fall 2012.  Hemoglobin is stable.  History of Feraheme  Last iron labs today, 1/18/19, normal.    *Anemia of chronic renal insufficiency, stage III.  She likes to minimize Procrit.  Has not received Procrit for quite some time.  Sees Dr. Federico Cagle.    *Worsening anemia.  2 unit PRBC transfusion.    *Acute kidney injury on chronic kidney disease, stage III.  Creatinine gradually worsening.  From 1.4-1.5, up to 1.8-1.9.  Creatinine carlitos to 3 on 12/20/18, prompting an urgent start of Velcade and Decadron.    *PICC associated DVT August 2010.  Completed 6 months Coumadin 3/20/11.  (If treatment for myeloma is given, will need to keep this in mind, since she had a clot in the past).    *Leg wound after a fall.  Occurred around late November 2018.  Wound is healing.  Patient and sons understand neutropenia from chemotherapy can delay healing and increase the risk of infection.    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  Completed 2 years of CAT scan follow-up 02/02/11.  Plan no more routine CAT scans.    *On 1/23/18 visit: Left leg swelling times 6-9 months.  I recommended she have a left leg Doppler today and if positive receive Lovenox daily through our office while awaiting a therapeutic INR on Coumadin.  I told her an untreated DVT could be life-threatening due to subsequent pulmonary embolism.  She understands this but absolutely refuses workup or treatment of this.  She does not like coming to doctor's appointments.  She doesn't want any more visits or trips in.    *Progressive functional decline.  Suspect this is related to recent chemotherapy and poor nutrition and poor hydration.  I suspect it is also related to her age     *Diarrhea due to chemotherapy.  If Velcade is tried again (which I doubt we will try), could take Imodium on a schedule beginning the evening of Velcade and continuing for a couple of  days.    PLAN:  · Continue weekly 1 L normal saline and stat CMP and CBC  · M.D. 2 weeks  · At the upcoming M.D. visit, we will see if she has improved after a 3 week break from therapy.  If not, I would recommend hospice.  I discussed this with patient and sons today.  · 2 unit PRBC transfusion  · She did not receive Xgeva today as we are considering hospice.  · Encouraged improved hydration and nutrition.      She is on drug therapy requiring intensive monitoring for toxicity.  We did more today than just assess side effects of chemotherapy and this is detailed above.    Send this note to Dr. Federico Cagle

## 2019-01-18 NOTE — PROGRESS NOTES
Per Dr. Syed, we will not give Velcade or Xgeva today.  Orders to give Normal Saline 1000 cc today.  R&V NEHA Kumar

## 2019-01-18 NOTE — TELEPHONE ENCOUNTER
----- Message from Bernadette Yusuf sent at 1/18/2019  2:07 PM EST -----  [phone] / wes her son  Wants to ask about  velcade shot and the 5 pills the day after the shot. She was taken off the shot

## 2019-01-18 NOTE — TELEPHONE ENCOUNTER
PT'S SON CALLING TO SEE IF PT WOULD CONTINUE WKLY DEX AND DAILY ACYCLOVIR. PER DR. BLACK PT TO STOP TAKING HER WKLY DEX BUT PT TO REMAIN ON ACYCLOVIR. SON MADE AWARE AND V/U.

## 2019-01-22 PROBLEM — E86.0 DEHYDRATION: Status: ACTIVE | Noted: 2019-01-01

## 2019-01-22 NOTE — PROGRESS NOTES
Hydration supportive plan entered for one liter NS weekly along with CBC and stat  CMP V.O. Dr. Syed

## 2019-01-24 NOTE — TELEPHONE ENCOUNTER
----- Message from Maribeth Ortiz RN sent at 1/24/2019 12:16 PM EST -----  Contact: [phone]  Add on an NP visit at EP tomorrow prior to fluids.   ----- Message -----  From: Akash Pryor  Sent: 1/24/2019  11:45 AM  To: [facility] Clinical Pool    Son Deshaun called   Had transfusion 1/20  Has had no improvement. Seems to be worse.

## 2019-01-24 NOTE — TELEPHONE ENCOUNTER
----- Message from Akash Pryor sent at 1/24/2019 11:45 AM EST -----  Contact: [phone]  Son Deshaun called   Had transfusion 1/20  Has had no improvement. Seems to be worse.

## 2019-01-24 NOTE — TELEPHONE ENCOUNTER
Son called very concerned about her status.  Said that after transfusion she did not get any better.  He feels like she needs around the clock care and it needs to come from a medical professional because if the family tells her that, she will be furious.  Son specifically wants the NP to say, you need more care, is your family able to provide it and then he will say no, and we can make further recommendations from there.  He wants us to tell her that she has to have extra care that her family cannot provide.  Son was very aggressive with his words and said he will be here at the visit tomorrow and wants to make sure I put all this in a note and discuss it with the NP.      Jennifer had discussed his concerns with Kylah NP and it was decided that patient would be added onto NP schedule for tomorrow while she goes to get IVF's.  In Dr. Syed's dictation, hospice had previously been discussed.     ----- Message from Akash Pryor sent at 1/24/2019 11:45 AM EST -----  Contact: [phone]  Son Deshaun called   Had transfusion 1/20  Has had no improvement. Seems to be worse.

## 2019-01-24 NOTE — TELEPHONE ENCOUNTER
Attempted to s/w pt. Son concerning pt. worsening condition after her blood transfusion on 1/20/19.   Unable to meet his satisfaction with the many questions.  Informed Deshaun he would probably be better served to s/w another nurse or np from the practice as I do not feel I am answering his questions to his satisfaction.  He is agreeable to s/w another nurse.  Chanda dalal rn will give him a call within 30 minutes.  Understanding noted.

## 2019-01-25 NOTE — H&P
Subjective .     REASON FOR ADMISSION:  Palliative care, acute renal failure, severe malnutrition,  progressive functional decline, multiple myeloma.     HISTORY OF PRESENT ILLNESS:  The patient is a 89 y.o. year old female who is here for follow-up with the above-mentioned history.    History of Present Illness     The patient presents to the office today, 1/25/2019 for triage visit regarding progressive decline and weakness.  The patient is historically seen in our office for myeloma, though is not undergoing treatment due to poor performance status.  The patient is seen today with 2 of her sons.  The patient is unable to my questions, therefore all of the history is provided by the patient's family.  The patient's son reports significant decline in the past week to 10 days.  Her performance status has worsened including progressive weakness and confusion.  The patient did receive a blood transfusion last week, hoping to gain some improvement, though unfortunately, has declined despite transfusion.  The patient is currently living at home by herself, though her children are checking on her frequently.  She has not had recent falls, though is not able to to complete basic activities of daily living on her own.  Upon my attempt to discuss the current situation with the patient, she is unable to answer questions or complete a sentence.  She is quite confused.  She does shake her head yes that she understands the questions, though is unable to answer my questions verbally.  She does state she is in pain, though unable to describe where.  Per the family's report, she is not eating and drinking.  Given the patient's significant decline, the family does wish to pursue hospice and palliative care.  Due to her confusion and pain and the speed of her decline we will proceed with inpatient palliative care     Past Medical History:   Diagnosis Date   • Anemia     Secondary to chronic renal insufficiency and folate  insufficiency   • Cataract    • Cystic mass of pancreatic head     On CT of 01/16/2009. stable in 02/2011   • DVT (deep venous thrombosis) (CMS/HCC) 08/2010    PICC-associated DVT resulting in removal of PICC   • Folate deficiency    • Frequent UTI    • Gout    • H/O RAJESH (acute kidney injury)    • H/O Clostridium difficile colitis    • Hammer toe 1995    S/P correction   • History of shingles    • Hypertension    • Hypogammaglobulinemia (CMS/[facility])    • Left hip pain    • Myeloma (CMS/[facility])     Smoldering myeloma versus multiple myeloma   • Osteoporosis    • PICC (peripherally inserted central catheter) removal     Secondary to DVT   • Renal insufficiency     Chronic   • Small bowel perforation (CMS/HCC) 07/2010    Small bowel and cecal perforation with abcess formation and infected mesh from prior hernia repair   • Temporal arteritis (CMS/[facility]) 11/10/2005   • Torn rotator cuff 2007       ONCOLOGIC HISTORY:  (History from previous dates can be found in the separate document.)    Current Facility-Administered Medications on File Prior to Visit   Medication Dose Route Frequency Provider Last Rate Last Dose   • [COMPLETED] acetaminophen (TYLENOL) tablet 500 mg  500 mg Oral Once Lili Schmitt APRN   500 mg at 01/25/19 1217   • [COMPLETED] sodium chloride 0.9 % infusion 500 mL  500 mL Intravenous Once Lili Schmitt APRN   Stopped at 01/25/19 1300     Current Outpatient Medications on File Prior to Visit   Medication Sig Dispense Refill   • acetaminophen (TYLENOL) 500 MG tablet Take 1 tablet by mouth Every 6 (Six) Hours As Needed for mild pain (1-3). 30 tablet 0   • acyclovir (ZOVIRAX) 400 MG tablet Take 0.5 tablets by mouth 2 (Two) Times a Day. 60 tablet 7   • amLODIPine (NORVASC) 5 MG tablet TAKE 1 TABLET BY MOUTH ONE TIME A DAY  90 tablet 0   • B Complex Vitamins (VITAMIN B COMPLEX) capsule capsule Take 1 capsule by mouth Daily.     • bumetanide (BUMEX) 0.5 MG tablet Take 1 tablet by mouth Daily. 30  tablet 2   • calcium-vitamin D (OSCAL-500) 500-200 MG-UNIT per tablet Take 1 tablet by mouth.     • cholecalciferol (VITAMIN D3) 1000 UNITS tablet Take 1,000 Units by mouth Daily.     • dexamethasone (DECADRON) 4 MG tablet Take 5 tablets by mouth 1 (One) Time Per Week. 20 tablet 3   • HYDROcodone-acetaminophen (NORCO) 7.5-325 MG per tablet Take 1 tablet by mouth Every 4 (Four) Hours As Needed for Moderate Pain . 24 tablet 0   • levothyroxine (SYNTHROID, LEVOTHROID) 75 MCG tablet Take 1 tablet by mouth Daily. 90 tablet 3   • Multiple Vitamins-Calcium (ONE-A-DAY WOMENS PO) Take  by mouth.     • mupirocin (BACTROBAN) 2 % nasal ointment Apply topically twice a day to right leg wound 15 g 2   • ondansetron (ZOFRAN) 8 MG tablet Take 1 tablet by mouth 3 (Three) Times a Day As Needed for Nausea or Vomiting. 30 tablet 5       ALLERGIES:     Allergies   Allergen Reactions   • Sulfa Antibiotics        Social History     Socioeconomic History   • Marital status:      Spouse name: Not on file   • Number of children: Not on file   • Years of education: Not on file   • Highest education level: Not on file   Social Needs   • Financial resource strain: Not on file   • Food insecurity - worry: Not on file   • Food insecurity - inability: Not on file   • Transportation needs - medical: Not on file   • Transportation needs - non-medical: Not on file   Occupational History     Employer: [employer]     Employer: RETIRED   Tobacco Use   • Smoking status: Former Smoker   • Smokeless tobacco: Never Used   Substance and Sexual Activity   • Alcohol use: No   • Drug use: No   • Sexual activity: Not on file   Other Topics Concern   • Not on file   Social History Narrative    Spouse  from what sounds like cholangiocarcinoma.         Cancer-related family history is negative for Cancer.     Review of Systems   Constitutional: Positive for fatigue. Negative for chills and fever.   HENT: Negative for mouth sores and sore  throat.    Eyes: Negative for visual disturbance.   Respiratory: Negative for cough and shortness of breath.    Cardiovascular: Negative for chest pain.   Gastrointestinal: Negative for abdominal pain, diarrhea, nausea and vomiting.   Genitourinary: Negative for dysuria and frequency.   Neurological: Positive for speech difficulty and weakness. Negative for dizziness.   Hematological: Does not bruise/bleed easily.   Psychiatric/Behavioral: Positive for agitation and confusion. The patient is not nervous/anxious.    All other systems reviewed and are negative.    .    Objective      Vitals:    01/25/19 1000   BP: 178/83   Pulse: 78   Resp: 18   SpO2: 98%      Current Status 1/18/2019   ECOG score 3       Physical Exam    GENERAL: thin, ill appearing female, confused, unable to answer questions appropriately, clearly uncomfortable. Oriented only to self  HEAD:  Normocephalic.  EYES:  Pupils equal, round.  EOMs intact.  Conjunctivae normal.  EARS:  Hearing intact.  CHEST:  Lungs clear to auscultation. Good airflow.  CARDIAC:  Regular rate and rhythm without murmurs. Normal S1,S2.  EXTREMITIES:  No clubbing, cyanosis or edema.    RECENT LABS:  Results from last 7 days   Lab Units 01/25/19  1011   WBC 10*3/mm3 5.37   NEUTROS ABS 10*3/mm3 4.71   HEMOGLOBIN g/dL 12.0   HEMATOCRIT % 37.7   PLATELETS 10*3/mm3 105*     Results from last 7 days   Lab Units 01/25/19  1011   SODIUM mmol/L 147*   POTASSIUM mmol/L 4.6   CHLORIDE mmol/L 116*   CO2 mmol/L 18.9*   BUN mg/dL 64*   CREATININE mg/dL 4.03*   CALCIUM mg/dL 9.2   ALBUMIN g/dL 3.30*   BILIRUBIN mg/dL 0.4   ALK PHOS U/L 74   ALT (SGPT) U/L 27   AST (SGOT) U/L 30   GLUCOSE mg/dL 92           Assessment/Plan     1.  IgG kappa multiple myeloma.  Unable to tolerate treatment with Velcade last given 1/18/19.  Continued progressive decline again despite supportive care including IV hydration and blood transfusion. Last given blood 1/20/2019.  Acute renal failure due to  dehydration, Creatinine is worse at 4.03 today. The patient is confused, unable to answer questions, she is disoriented. She is severely malnourished with little to no intake.   I have reviewed with the family including 2 sons in person and 1 child via conference call, the best plan of care including palliative care with hospice.  Due to the patient's rapidly declining performance status, pain we will proceed with proceed with inpatient palliative care on [address].  The family is agreeable to this plan.  The family is agreeable for the patient to be a DNR.  Her pain will be managed appropriately.  Dr. Syed did speak with the patient and both sons present today, he is in agreement with this plan of care. Both Dr. Syed and I suspect the patients life expectancy would be measured in days.     Total of 120 minutes spent with the patient and family discussing the plan and goals of care.    Lili Schmitt, APRN  01/25/2019

## 2019-02-01 ENCOUNTER — APPOINTMENT (OUTPATIENT)
Dept: ONCOLOGY | Facility: CLINIC | Age: 84
End: 2019-02-01

## 2019-02-01 ENCOUNTER — APPOINTMENT (OUTPATIENT)
Dept: OTHER | Facility: HOSPITAL | Age: 84
End: 2019-02-01

## 2019-02-01 ENCOUNTER — APPOINTMENT (OUTPATIENT)
Dept: ONCOLOGY | Facility: HOSPITAL | Age: 84
End: 2019-02-01

## 2019-10-16 NOTE — PROGRESS NOTES
Subjective .     REASONS FOR FOLLOWUP:  multiple myeloma, anemia    HISTORY OF PRESENT ILLNESS:  The patient is a 89 y.o. year old female with the above-mentioned history who is here today for reevaluation.  She is also due for Velcade.  We have been giving her IV hydration due to worsening kidney function.  She continues to struggle with drinking water.  She is only drinking 1-2, 8 ounce glasses of water per day.  Last Saturday after her Velcade she did have some worsening fatigue.  Her son states that she had issues with diarrhea, although the patient denies this.  She also had some mild nausea, but denies vomiting.  She is feeling well today, and has no new complaints or concerns.    Past Medical History:   Diagnosis Date   • Anemia     Secondary to chronic renal insufficiency and folate insufficiency   • Cataract    • Cystic mass of pancreatic head     On CT of 01/16/2009. 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   • Osteoporosis    • PICC (peripherally inserted central catheter) removal     Secondary to DVT   • Renal insufficiency     Chronic   • Small bowel perforation (CMS/HCC) 07/2010    Small bowel and cecal perforation with abcess formation and infected mesh from prior hernia repair   • Temporal arteritis (CMS/HCC) 11/10/2005   • Torn rotator cuff 2007     Past Surgical History:   Procedure Laterality Date   • BLADDER REPAIR  1995   • CATARACT EXTRACTION  2009   • COLONOSCOPY  1997   • COLONOSCOPY  2009   • COLOSTOMY  01/2009    Patient had a ruptured sigmoid diverticulum which led to a diverting  Bari Pike called to report that she has not gone over the 75% feeling better  She still has discomfort going up the left side of her throat  Should she come in to see you again? colostomy   • COLOSTOMY CLOSURE     • CORRECTION HAMMER TOE  1995   • ENDOSCOPY     • EYE SURGERY  04/2007    Cataract   • GLAUCOMA SURGERY  2004   • HERNIA REPAIR  04/26/2010    Ventral hernia repair from prior sigmoid resection   • HYSTERECTOMY  1995   • INCISIONAL HERNIA REPAIR  2010    Dr. Mcgee   • JOINT REPLACEMENT  2002    Bilateral knee replacements       HEMATOLOGIC/ONCOLOGIC HISTORY:  (History from previous dates can be found in the separate document.)    MEDICATIONS    Current Outpatient Medications:   •  acetaminophen (TYLENOL) 500 MG tablet, Take 1 tablet by mouth Every 6 (Six) Hours As Needed for mild pain (1-3)., Disp: 30 tablet, Rfl: 0  •  acyclovir (ZOVIRAX) 400 MG tablet, Take 0.5 tablets by mouth 2 (Two) Times a Day., Disp: 60 tablet, Rfl: 7  •  amLODIPine (NORVASC) 5 MG tablet, TAKE 1 TABLET BY MOUTH ONE TIME A DAY , Disp: 90 tablet, Rfl: 0  •  B Complex Vitamins (VITAMIN B COMPLEX) capsule capsule, Take 1 capsule by mouth Daily., Disp: , Rfl:   •  bumetanide (BUMEX) 1 MG tablet, TAKE 1 TABLET BY MOUTH ONE TIME A DAY , Disp: 90 tablet, Rfl: 0  •  calcium-vitamin D (OSCAL-500) 500-200 MG-UNIT per tablet, Take 1 tablet by mouth., Disp: , Rfl:   •  cholecalciferol (VITAMIN D3) 1000 UNITS tablet, Take 1,000 Units by mouth Daily., Disp: , Rfl:   •  dexamethasone (DECADRON) 4 MG tablet, Take 5 tablets by mouth 1 (One) Time Per Week., Disp: 20 tablet, Rfl: 3  •  HYDROcodone-acetaminophen (NORCO) 7.5-325 MG per tablet, Take 1 tablet by mouth Every 4 (Four) Hours As Needed for Moderate Pain ., Disp: 24 tablet, Rfl: 0  •  levothyroxine (SYNTHROID, LEVOTHROID) 75 MCG tablet, TAKE 1 TABLET BY MOUTH ONE TIME A DAY , Disp: 90 tablet, Rfl: 0  •  Multiple Vitamins-Calcium (ONE-A-DAY WOMENS PO), Take  by mouth., Disp: , Rfl:   •  mupirocin (BACTROBAN) 2 % nasal ointment, Apply topically twice a day to right leg wound, Disp: 15 g, Rfl: 2  •  ondansetron (ZOFRAN) 8 MG tablet, Take 1 tablet by mouth 3 (Three)  Times a Day As Needed for Nausea or Vomiting., Disp: 30 tablet, Rfl: 5  •  predniSONE (DELTASONE) 10 MG tablet, 2 by mouth twice a day for 4 days, then one twice a day for 4 days, then 1 every morning until taken, Disp: 28 tablet, Rfl: 0  •  cephalexin (KEFLEX) 500 MG capsule, Take 1 capsule by mouth 2 (Two) Times a Day., Disp: 14 capsule, Rfl: 0  •  levothyroxine (SYNTHROID, LEVOTHROID) 75 MCG tablet, Take 1 tablet by mouth Daily., Disp: 90 tablet, Rfl: 1  No current facility-administered medications for this visit.     Facility-Administered Medications Ordered in Other Visits:   •  bortezomib (VELCADE) chemo injection 1.8 mg, 1.3 mg/m2 (Treatment Plan Recorded), Subcutaneous, Once, Gregory Syed II, MD  •  Sodium chloride 0.9 % infusion 500 mL, 500 mL, Intravenous, Continuous, Irais Hoffman APRN    ALLERGIES:     Allergies   Allergen Reactions   • Sulfa Antibiotics        SOCIAL HISTORY:       Social History     Socioeconomic History   • Marital status:      Spouse name: Not on file   • Number of children: Not on file   • Years of education: Not on file   • Highest education level: Not on file   Social Needs   • Financial resource strain: Not on file   • Food insecurity - worry: Not on file   • Food insecurity - inability: Not on file   • Transportation needs - medical: Not on file   • Transportation needs - non-medical: Not on file   Occupational History     Employer: [employer]     Employer: RETIRED   Tobacco Use   • Smoking status: Former Smoker   • Smokeless tobacco: Never Used   Substance and Sexual Activity   • Alcohol use: No   • Drug use: No   • Sexual activity: Not on file   Other Topics Concern   • Not on file   Social History Narrative    Spouse  from what sounds like cholangiocarcinoma.         FAMILY HISTORY:  Family History   Problem Relation Age of Onset   • Cancer Neg Hx        REVIEW OF SYSTEMS:  Review of Systems   Constitutional: Positive for fatigue. Negative  "for activity change.   HENT: Negative for nosebleeds and trouble swallowing.    Respiratory: Negative for shortness of breath and wheezing.    Cardiovascular: Positive for leg swelling. Negative for chest pain and palpitations.   Gastrointestinal: Negative for constipation, diarrhea and nausea.   Genitourinary: Negative for dysuria and hematuria.   Musculoskeletal: Negative for arthralgias and myalgias.   Skin: Negative for rash and wound.        See HPI   Neurological: Negative for seizures and syncope.   Hematological: Negative for adenopathy. Does not bruise/bleed easily.   Psychiatric/Behavioral: Negative for confusion.      1/4/19 review of systems unchanged from 12 except as noted.    Objective    Vitals:    01/04/19 0920   BP: 132/70   Pulse: 84   Resp: 16   Temp: 97.5 °F (36.4 °C)   SpO2: 100%  Comment: at rest   Weight: 45.8 kg (100 lb 14.4 oz)   Height: 152.4 cm (60\")   PainSc: 0-No pain     Current Status 12/28/2018   ECOG score 0      Physical Exam   Constitutional: She is oriented to person, place, and time. She appears well-developed and well-nourished. No distress.   HENT:   Head: Normocephalic and atraumatic.   Mouth/Throat: Oropharynx is clear and moist and mucous membranes are normal. No oropharyngeal exudate.   Eyes: Pupils are equal, round, and reactive to light.   Neck: Normal range of motion.   Cardiovascular: Normal rate, regular rhythm and normal heart sounds.   No murmur heard.  Pulmonary/Chest: Effort normal and breath sounds normal. No respiratory distress. She has no wheezes. She has no rhonchi. She has no rales.   Abdominal: Soft. Normal appearance and bowel sounds are normal. She exhibits no distension. There is no hepatosplenomegaly.   Musculoskeletal: Normal range of motion. She exhibits edema (trace bilateral ankle edema left slightly greater than right).   Neurological: She is alert and oriented to person, place, and time.   Skin: Skin is warm and dry. No rash noted.   Right shin " wound from prior fall about 2 cm in size.  There is no signs of infection.   Psychiatric: She has a normal mood and affect.   Vitals reviewed.  1/4/19 physical exam is unchanged from above.  RECENT LABS:        WBC   Date/Time Value Ref Range Status   01/04/2019 09:06 AM 4.21 4.00 - 10.00 10*3/mm3 Final     Hemoglobin   Date/Time Value Ref Range Status   01/04/2019 09:06 AM 9.5 (L) 11.5 - 14.9 g/dL Final     Platelets   Date/Time Value Ref Range Status   01/04/2019 09:06  (L) 150 - 375 10*3/mm3 Final     Lab Results   Component Value Date    GLUCOSE 83 01/04/2019    BUN 57 (H) 01/04/2019    CREATININE 3.01 (C) 01/04/2019    EGFRIFNONA 15 (L) 01/04/2019    EGFRIFAFRI 30 (L) 04/23/2018    BCR 18.9 01/04/2019    K 4.3 01/04/2019    CO2 15.7 (L) 01/04/2019    CALCIUM 8.2 (L) 01/04/2019    PROTENTOTREF 6.5 11/19/2018    ALBUMIN 3.60 01/04/2019    LABIL2 1.4 11/19/2018    AST 25 01/04/2019    ALT 20 01/04/2019       Assessment/Plan     ASSESSMENT:  *IgG kappa multiple myeloma.  (Bone marrow 3/31/11 revealed 10% monoclonal IgG kappa plasma cells.  This was smoldering myeloma until she became symptomatic from a calcium of 11.6 on 11/19/18)  Main issue is anemia was anemia which was successfully treated with Procrit.  However, developed a calcium 11.6 on 11/19/18 and creatinine rise up to 3 on 12/20/18.  PET 12/11/18 negative for myeloma bone lesions  · Serum M spike stable at 0.8.  · Kappa/lambda ratio in urine 111 (no prior for comparison).  We do not have a recent serum kappa/lambda ratio.   Discussed this case with Dr. Danielle from our practice and he agrees with my assessment of this now being multiple myeloma and with the need to begin treatment.  Also discussed this with Dr. Federico Cagle, her nephrologist.  He agrees the kidney dysfunction is likely from multiple myeloma.  Typically, IUs Revlimid 15 mg daily D1-21/28 days with Decadron 20 mg weekly for elderly patients.  However, with her renal dysfunction this  would need to be dose reduced, likely to 2.5 mg daily D1-21/28 days.  With renal dysfunction, leukocytopenia can become more problematic from Revlimid.  Therefore, generally Revlimid is avoided with acute renal insufficiency.  Therefore, I have recommended Velcade weekly 3 out of 4 weeks with Decadron 20 mg weekly.  Patient and sons want to proceed with therapy.  They understand this is not curable.  Transportation is problematic and therefore weekly Velcade is not a good long-term solution for her.  Furthermore, she is frail with limited mobility and has bilateral finger numbness, suspected to be due to cervical disc disease.  I think significant peripheral neuropathy from Velcade would be quite problematic for her quality of life.  My plan is to hopefully improve creatinine with Velcade and then switch to Revlimid and Decadron.  · Patient returns 1/4/2019 for cycle 1, day 15 Velcade.  Unfortunately, her creatinine is elevated to 3.01 to day (previously down to 2.6).  We will proceed with 1 L of IV normal saline again in the office as well as her Velcade.  She will return in one week for repeat stat CMP and possible IV fluids.  I encouraged the patient to try to at least double up on the water that she drinks at home, if not more.    *Hypercalcemia.  Calcium highest value 11.6 on 11/19/18.  · Xgeva, monthly, dose 1 on 11/21/18.  · Calcium normal currently.  Continue Xgeva monthly.    *Iron deficiency anemia.  (In the past, difficulty tolerating by mouth iron).  However, has been on iron 3 times per week since the fall 2012.  Hemoglobin is stable.  History of Feraheme  Last iron labs November 2018, unremarkable.    *Anemia of chronic renal insufficiency, stage III.  She likes to minimize Procrit.  Has not received Procrit for quite some time.  Sees Dr. Federico Cagle.    *Acute kidney injury on chronic kidney disease, stage III.  Creatinine gradually worsening.  From 1.4-1.5, up to 1.8-1.9.  Creatinine carlitos to 3 on  12/20/18, prompting an urgent start of Velcade and Decadron.  12/20/2018 creatinine improved to 2.63.    *PICC associated DVT August 2010.  Completed 6 months Coumadin 3/20/11.  (If treatment for myeloma is given, will need to keep this in mind, since she had a clot in the past).    *Leg wound after a fall.  Occurred around late November 2018.  Wound is healing.  Patient and sons understand neutropenia from chemotherapy can delay healing and increase the risk of infection.    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  Completed 2 years of CAT scan follow-up 02/02/11.  Plan no more routine CAT scans.    *On 1/23/18 visit: Left leg swelling times 6-9 months.  I recommended she have a left leg Doppler today and if positive receive Lovenox daily through our office while awaiting a therapeutic INR on Coumadin.  I told her an untreated DVT could be life-threatening due to subsequent pulmonary embolism.  She understands this but absolutely refuses workup or treatment of this.  She does not like coming to doctor's appointments.  She doesn't want any more visits or trips in.    PLAN:  · 1 L normal saline today.  · Velcade today (weekly 3 out of 4 weeks, at at 12/20/2018) sees.  · Continue Decadron 20 mg weekly.  · Return in one week for stat CMP and possible IV fluids.  · Return in 2 weeks for reevaluation by MEMMANUEL when she is due to start cycle 2, day 1 Velcade.  We'll check a CBC and stat CMP at that visit.    · When renal dysfunction improves, may change to Revlimid and Decadron both for transportation issues and to decrease risk of neuropathy.    · Monthly Xgeva, (last October 2018).    · Stat CMP weekly for now  · Check SPEP, SIFE, serum free light chains and iron labs every 1-2 months

## 2021-03-16 NOTE — PROGRESS NOTES
B/P 155/66 prior to IV infusion.  B/P 190/82 at completion. Lungs clear bilat after IV infusion. DELORES Marie notified of elevated B/P. Patient and son instructed to monitor B/P at home and follow-up with PCP if remains elevated. Verbalized understanding.   Your next [address]  check is scheduled for 6/17/21. Coronary Artery Disease   (CAD; Coronary Atherosclerosis; Silent MI; Coronary Heart Disease; Ischemic Heart Disease; Atherosclerosis of the Coronary Arteries)     Definition   Coronary arteries bring oxygen rich blood to the heart muscle. Coronary artery disease (CAD) is blockage of these arteries. If the blockage is complete, areas of the heart muscle may be damaged. In severe case the heart muscle dies. This can lead to a heart attack, also known as a myocardial infarction (MI). Coronary artery disease is the most common form of heart disease. It is the leading cause of death worldwide. Causes include: Thickening of the walls of the arteries feeding the heart muscle   Accumulation of fatty plaques within the coronary arteries   Sudden spasm of a coronary artery   Narrowing of the coronary arteries   Inflammation within the coronary arteries   Development of a blood clot within the coronary arteries that blocks blood flow      Major risk factors include:   Sex: male (men have a greater risk of heart attack than women)   Age: 39 and older for men, 54 and older for women   Heredity: strong family history of heart disease   Obesity and being overweight   Smoking   High blood pressure   Sedentary lifestylePoor fitness can also increase your risk of CAD and premature death. High cholesterol (specifically, high LDL cholesterol, and low HDL cholesterol)   Diabetes   Metabolic syndrome (combination of high blood pressure, abdominal obesity, and insulin resistance)     Other risk factors may include:   Sleep Apnea  Stress   Excessive alcohol use   Depression   A diet that is high in saturated fat, trans fat, cholesterol, and/or caloriesDrinking sugary beverages on a regular basis may increase your risk of CAD. Symptoms   CAD may progress without any symptoms. Angina is chest pain that comes and goes.  It often has a squeezing or pressure-like quality. It may radiate into the shoulder(s), arm(s), or jaw. Angina usually lasts for about 2-10 minutes. It is often relieved with rest. Angina can be triggered by:   Exercise or exertion   Emotional stress   Cold weather   A large meal   Chest pain may indicate more serious unstable angina or a heart attack if: It is unrelieved by rest or nitroglycerin   Severe angina   Angina that begins at rest (with no activity)   Angina that lasts more than 15 minutes   Accompanying symptoms may include:   Shortness of breath   Sweating   Nausea   Weakness   Immediate medical attention is needed for unstable angina. CAD in women may cause less classic chest pain. It is likely to start with shortness of breath and fatigue. Diagnosis   If you go to the emergency room with chest pain, some tests will be done right away. The tests will attempt to see if you are having angina or a heart attack. If you have a stable pattern of angina, other tests may be done to determine the severity of your disease. The doctor will ask about your symptoms and medical history. A physical exam will be done.    Tests may include:   Blood tests to look for certain substances in the blood called troponins which help the doctor determine if you are having a heart attack   Electrocardiogram (ECG, EKG) records the heart's activity by measuring electrical currents through the heart muscle, and can reveal evidence of past heart attacks, acute heart attacks, and heart rhythm problems   Echocardiogram uses high-frequency sound waves (ultrasound) to examine the size, shape, and motion of the heart, giving information about the structure and function of the heart   Exercise stress test records the heart's electrical activity during increased physical activity   Nuclear stress test the heart is observed while exercising and radioactive material highlights impaired blood flow to help locate problem areas   Coronary calcium scoring a type of x-ray called a CAT scan that uses a computer to look for the presence of calcium in the heart arteries   Coronary angiography x-rays taken after a dye is injected into the arteries to allows the doctor to look for abnormalities in the arteries   Treatment   Treatment may include:   Nitroglycerin   This medicine is usually given during an attack of angina. It can be given as a tablet that dissolves under the tongue or as a spray. Longer-lasting types can be used to prevent angina before an activity known to cause it. These may be given as pills or applied as patches or ointments. Blood-Thinning Medications   A small, daily dose of aspirin has been shown to decrease the risk of heart attack. Ask your doctor before taking aspirin daily. Warfarin (Coumadin)   Ticlopidine (Ticlid)   Clopidogrel (Plavix)   Beta-Blockers, Calcium-Channel Blockers, and ACE-Inhibitors   These may help prevent angina. In some cases, they may lower the risk of heart attack. Medications to Lower Cholesterol   Medicines, like statins, are often prescribed to people who have CAD. Statins (eg, atorvastatin [Lipitor]) lower cholesterol levels, which can help to prevent CAD events. Revascularization   Patients with severe blockages in their coronary arteries may benefit from procedures to immediately improve blood flow to the heart muscle:   Percutaneous coronary interventions (PCI)such as balloon angioplasty , in some cases, a wire mesh stent is placed to hold the artery open   Coronary artery bypass grafting (CABG) segments of vessels are taken from other areas of the body and are sewn into the heart arteries to reroute blood flow around blockages   Some studies have shown that CABG may be more effective than PCI. Lifestyle changes and intensive medicine may also be just as effective as PCI.    Options for Refractory Angina   For patients who are not candidates for revascularization procedures but have continued angina despite medicine, options include: Enhanced external counterpulsation (EECP) large air bags are inflated around the legs in tune with the heart beat. The patient receives 5 one-hour treatments per week for seven weeks. This has been shown to reduce angina and may improve symptom-free exercise duration. Transmyocardial revascularization (TMR) surgical procedure done with laser to reduce chest pain. Researchers are also studying gene therapy as a possible treatment. Prevention   To reduce your risk of getting coronary artery disease:   Maintain a healthy weight. Eat a heart healthy diet that is low in saturated fat , red meat and processed meats, and rich in whole grain , fruits, and vegetables . Begin a safe exercise program with the advice of your doctor. If you smoke, quit . Treat your high blood pressure and/or diabetes. Treat high cholesterol or triglycerides. Ask your doctor about taking a low-dose aspirin every day. In certain patients, taking rosuvastatin (Crestor) may be another option. Talk to your doctor. Hypertension  (High Blood Pressure)  Most people with high blood pressure (hypertension) have no symptoms. High blood pressure can be a dangerous problem. Hypertension is dangerous because you may have it and not know it. High blood pressure may mean that your heart needs to work harder to pump blood. Your blood pressure is measured with 2 numbers. The first number is when your heart flexes (contracts), and the second number is when your heart relaxes. The higher the numbers are, the more you are at risk for problems. Write down your blood pressure today. The best blood pressure for adults is 120/80 (mmHg) or lower. It is likely that your blood pressure was recorded at least 2 times today. It is important for you to give these numbers to your doctor. If you do not have a doctor, try to get follow-up care at a hospital or community clinic. You may need to start high blood pressure medicine.  You may also need to adjust your medicines as told by your doctor. Even mild high blood pressure increases long-term health risks. One high reading does not mean you have hypertension. · Your blood pressure should be taken when you are relaxed. It is also important to sit for about 10 minutes before being tested. · Things that can increase your blood pressure are:  Injury, Illness, Stress, Caffeine, and some medicines (like decongestants). · High blood pressure does not usually need emergency treatment. HOME CARE  · Lifestyle and medicine changes may be needed, including:   · Weight loss. · Exercise. · Limit the use of salt. · Stop smoking. · If using decongestants or birth control pills, talk to your doctor. These medicines might make blood pressure higher. · Do not use street drugs. · Females should not drink more than 1 alcoholic drink per day. Males should not drink more than 2 alcoholic drinks per day. · Take your blood pressure medicine. You will need to take it every day. If you do not get treated, there are risks, including:   · Heart disease. · Stroke. · Kidney failure. · See your doctor as told. GET HELP RIGHT AWAY IF:  · You get a very bad headache. · You get blurred or changing vision. · You feel confused. · You feel weak, numb, or faint. · You get chest or belly (abdominal) pain. · You throw up (vomit). · You cannot breathe very well. If you have a blood pressure reading with a top number of 180 or higher, you need to see your doctor right away. This is especially true if you are having any of the problems listed above.